# Patient Record
Sex: MALE | Race: ASIAN | NOT HISPANIC OR LATINO | Employment: OTHER | URBAN - METROPOLITAN AREA
[De-identification: names, ages, dates, MRNs, and addresses within clinical notes are randomized per-mention and may not be internally consistent; named-entity substitution may affect disease eponyms.]

---

## 2017-02-23 ENCOUNTER — OFFICE VISIT (OUTPATIENT)
Dept: INTERNAL MEDICINE | Facility: MEDICAL CENTER | Age: 57
End: 2017-02-23
Payer: MEDICAID

## 2017-02-23 VITALS
SYSTOLIC BLOOD PRESSURE: 130 MMHG | HEART RATE: 73 BPM | OXYGEN SATURATION: 97 % | DIASTOLIC BLOOD PRESSURE: 86 MMHG | WEIGHT: 149.2 LBS | HEIGHT: 63 IN | TEMPERATURE: 97.6 F | BODY MASS INDEX: 26.44 KG/M2

## 2017-02-23 DIAGNOSIS — E78.5 DYSLIPIDEMIA: ICD-10-CM

## 2017-02-23 DIAGNOSIS — M80.00XD PATHOLOGICAL FRACTURE DUE TO OSTEOPOROSIS WITH ROUTINE HEALING, UNSPECIFIED FRACTURE SITE, UNSPECIFIED OSTEOPOROSIS TYPE, SUBSEQUENT ENCOUNTER: ICD-10-CM

## 2017-02-23 DIAGNOSIS — M79.10 MYALGIA: ICD-10-CM

## 2017-02-23 DIAGNOSIS — G70.00 MG (MYASTHENIA GRAVIS) (HCC): ICD-10-CM

## 2017-02-23 DIAGNOSIS — R73.01 IMPAIRED FASTING BLOOD SUGAR: ICD-10-CM

## 2017-02-23 DIAGNOSIS — Z82.49 FAMILY HISTORY OF CORONARY ARTERY DISEASE: ICD-10-CM

## 2017-02-23 PROCEDURE — 99214 OFFICE O/P EST MOD 30 MIN: CPT | Performed by: INTERNAL MEDICINE

## 2017-02-23 NOTE — MR AVS SNAPSHOT
"        Wang Ivy   2017 9:00 AM   Office Visit   MRN: [id number]    Department:  [facility] Med - Internal Med   Dept Phone:  [phone]    Description:  Male : 1960   Provider:  Juan Griffith M.D.           Reason for Visit     Follow-Up reschedule follow up      Allergies as of 2017     Allergen Noted Reactions    Cillins [Penicillins] 03/10/2016   Anaphylaxis    Historical=Per family/ strong family history  RXN=unknown    Merrem [Meropenem] 03/10/2016   Unspecified    Historical=Family member had SJS reaction to this med  RXN=unknown      You were diagnosed with     Pathological fracture due to osteoporosis with routine healing, unspecified fracture site, unspecified osteoporosis type, subsequent encounter   [4868278]       Myalgia   [776612]       MG (myasthenia gravis) (CMS-[facility])   [265426]       Impaired fasting blood sugar   [589003]       Dyslipidemia   [623175]         Vital Signs     Blood Pressure Pulse Temperature Height Weight Body Mass Index    130/86 mmHg 73 36.4 °C (97.6 °F) 1.6 m (5' 3\") 67.677 kg (149 lb 3.2 oz) 26.44 kg/m2    Oxygen Saturation Smoking Status                97% Never Smoker           Basic Information     Date Of Birth Sex Race Ethnicity Preferred Language    1960 Male  Non- English      Your appointments     Aug 24, 2017  9:00 AM   Established Patient with Juan Griffith M.D.   [facility] Med - Internal Medicine (--)    [address] [place] [id number] [phone]           You will be receiving a confirmation call a few days before your appointment from our automated call confirmation system.              Problem List              ICD-10-CM Priority Class Noted - Resolved    MG (myasthenia gravis) (CMS-HCC) G70.00   3/10/2016 - Present    Acute back pain M54.9   3/10/2016 - Present    Osteoporosis with fracture M80.00XA   2016 - Present    Elevated liver enzymes R74.8   2016 - Present   " Subclinical hypothyroidism E03.9   6/13/2016 - Present    Compression fracture of cervical spine (CMS-[facility]) M48.52XA   6/13/2016 - Present    Impaired fasting blood sugar R73.01   10/21/2016 - Present    Dyslipidemia E78.5   10/21/2016 - Present    Heel pain, bilateral M79.671, M79.672   11/21/2016 - Present      Health Maintenance        Date Due Completion Dates    IMM DTaP/Tdap/Td Vaccine (1 - Tdap) 2/2/1978 ---    IMM INFLUENZA (1) 9/1/2016 ---    COLONOSCOPY 2/23/2027 2/23/2017 (N/S)    Override on 2/23/2017: (N/S) (PER [facility] AND [facility] NO COLONOSCOPY)            Current Immunizations     No immunizations on file.      Below and/or attached are the medications your provider expects you to take. Review all of your home medications and newly ordered medications with your provider and/or pharmacist. Follow medication instructions as directed by your provider and/or pharmacist. Please keep your medication list with you and share with your provider. Update the information when medications are discontinued, doses are changed, or new medications (including over-the-counter products) are added; and carry medication information at all times in the event of emergency situations     Allergies:  CILLINS - Anaphylaxis     MERREM - Unspecified               Medications  Valid as of: February 23, 2017 -  9:40 AM    Generic Name Brand Name Tablet Size Instructions for use    Alendronate Sodium (Tab) FOSAMAX 70 MG Take 1 Tab by mouth every 7 days.        Alendronate Sodium (Tab) FOSAMAX 70 MG TAKE ONE TABLET BY MOUTH EVERY 7 DAYS        Aspirin (Tab)  MG Take 650 mg by mouth as needed. Indications: Mild to Moderate Pain        azathioprine (IMURAN) 25 MG Tab (Tab) IMURAN 25 MG Take 50 mg by mouth 3 times a day.        B Complex Vitamins   Take 1 Tab by mouth every day.        Calcium   Take 1 Tab by mouth every day.        Cholecalciferol (Cap) Vitamin D 2000 UNITS Take 1 Cap by mouth every day.        Flaxseed (Linseed)    Take 1 Tab by mouth every day.        Misc Natural Products   Take 3 Tabs by mouth every day.        Oxycodone-Acetaminophen (Tab) PERCOCET-10  MG Take 1-2 Tabs by mouth every four hours as needed for Severe Pain.        PredniSONE (Tab) DELTASONE 20 MG Take 5 mg by mouth every morning. Indications: myasthenia gravis        Pyridostigmine Bromide (Tab) MESTINON 60 MG Take 60 mg by mouth 3 times a day. Indications: Myasthenia Gravis        Red Yeast Rice Extract (Tab) Red Yeast Rice Extract 600 MG Take 1 Tab by mouth 2 Times a Day.        Turmeric (Cap) Turmeric 500 MG Take 1,500 mg by mouth every day.        .                 Medicines prescribed today were sent to:     [facility] [place] - [id number] [facility] [place] [address]    Phone: [phone] Fax: [phone]    Open 24 Hours?: No      Medication refill instructions:       If your prescription bottle indicates you have medication refills left, it is not necessary to call your provider’s office. Please contact your pharmacy and they will refill your medication.    If your prescription bottle indicates you do not have any refills left, you may request refills at any time through one of the following ways: The online MyStream system (except Urgent Care), by calling your provider’s office, or by asking your pharmacy to contact your provider’s office with a refill request. Medication refills are processed only during regular business hours and may not be available until the next business day. Your provider may request additional information or to have a follow-up visit with you prior to refilling your medication.   *Please Note: Medication refills are assigned a new Rx number when refilled electronically. Your pharmacy may indicate that no refills were authorized even though a new prescription for the same medication is available at the pharmacy. Please request the medicine by name with the pharmacy before contacting your  provider for a refill.        Instructions    Keep losing weight and exercising          MyChart Access Code: Activation code not generated  Current MyChart Status: Active

## 2017-02-24 NOTE — PROGRESS NOTES
Established Patient    Mr. Pope a 57 y.o. male who presents today with:  CC: Follow-Up        Assessment and Plan    1. Myalgia  Improved. I suspect that this is secondary to side effects of Imuran plus his chronic steroid use. He does not have red flag symptoms such as fasciculations, atrophy or muscle weakness. He still has laboratory work pending which she states she will be done this week to evaluate for PMR, myopathy.    2. Pathological fracture due to osteoporosis with routine healing, unspecified fracture site, unspecified osteoporosis type, subsequent encounter  Secondary to chronic steroid use and osteoporosis. I suspect that he will require at least 5 years of bisphosphonate therapy. This has been approximately one year and fortunately he does not have side effects. Therefore he will continue weekly alendronate, vitamin D and calcium    3. MG (myasthenia gravis) (CMS-[facility])  Managed by neuro-ophthalmology.    4. Impaired fasting blood sugar  He has an A1c repeat pending. Encouraged patient to continue to lose weight and practiced sensible eating habits and exercise    5. Dyslipidemia  Repeat lipid panel is pending. I did inform him that it is unlikely that even if his lipid panel has improved his overall ASCVD will decrease enough so that he is not a candidate for statin therapy    6. Family history of coronary artery disease  This patient has one brother at the age of 67 with a triple bypass. I suspect that he most likely developed arterial sclerosis several years prior to this. He also has a brother with a significant history of coronary vessel disease requiring angioplasty in his early 50s. This patient has impaired fasting glucose, dyslipidemia and chronic steroid use all which can accelerate arteriosclerosis. We discussed methods of noninvasive risk stratification for coronary artery disease and he has agreed to coronary artery calcium scoring. He currently denies any dyspnea on exertion, chest pain on  exertion, fatigue with exertion.    Current Outpatient Prescriptions   Medication Sig Dispense Refill   • alendronate (FOSAMAX) 70 MG Tab Take 1 Tab by mouth every 7 days. 4 Tab 0   • predniSONE (DELTASONE) 20 MG Tab Take 5 mg by mouth every morning. Indications: myasthenia gravis     • pyridostigmine (MESTINON) 60 MG Tab Take 60 mg by mouth 3 times a day. Indications: Myasthenia Gravis     • azathioprine (IMURAN) 25 MG Tab Take 50 mg by mouth 3 times a day.     • Flaxseed, Linseed, (FLAXSEED OIL PO) Take 1 Tab by mouth every day.     • B Complex Vitamins (B COMPLEX 1 PO) Take 1 Tab by mouth every day.     • Cholecalciferol (VITAMIN D) 2000 UNITS Cap Take 1 Cap by mouth every day.     • CALCIUM PO Take 1 Tab by mouth every day.     • alendronate (FOSAMAX) 70 MG Tab TAKE ONE TABLET BY MOUTH EVERY 7 DAYS 4 Tab 3   • Oxycodone-Acetaminophen (PERCOCET-10)  MG Tab Take 1-2 Tabs by mouth every four hours as needed for Severe Pain. 60 Tab 0   • aspirin (ASA) 325 MG Tab Take 650 mg by mouth as needed. Indications: Mild to Moderate Pain     • Red Yeast Rice Extract 600 MG Tab Take 1 Tab by mouth 2 Times a Day.     • Misc Natural Products (GLUCOS-CHONDROIT-MSM COMPLEX PO) Take 3 Tabs by mouth every day.     • Turmeric 500 MG Cap Take 1,500 mg by mouth every day.       No current facility-administered medications for this visit.         followup Return in about 6 months (around 8/23/2017).      _______________________________________________________    HPI:   1. Myalgia  Patient is here for follow-up on myalgias. During his visit in December 2016, He described large muscle group myalgias without red flag symptoms of fasciculation, motor weakness, dark urine and use of statins. Most likely etiology of the time was a side effect of Imuran or chronic steroid use for his myasthenia gravis. I ordered at that time a creatinine kinase, ESR, aldolase, TSH and CBC. He has not done these lab tests as of yet. Fortunately, for some  reason his myalgias are not as severe as compared to the last visit. He still continues to deny any motor weakness or significant arthralgias. He is not that worried about an allergist at this point. It is normally worse at the end of the day.    2. Pathological fracture due to osteoporosis with routine healing, unspecified fracture site, unspecified osteoporosis type, subsequent encounter  He has a history of a thoracic spine fracture secondary osteoporosis. In the past he has significant post fracture pain. He is status post kyphoplasty. He currently is prescribed his phosphonate, vitamin D and calcium. He also tries to perform weightbearing exercises several days a week. I referred him to endocrinology for evaluation of a PTH analog such as Forteo. I reviewed the consultation. Dr. Parrish was satisfied with his current therapy because he had not suffered a fracture while taking bisphosphonates. He also had a remote history of hypogonadism. Repeat testosterone levels were within normal limits. Dr. Parrish did not think that hypogonadism was a accurate diagnosis.  He denies any gastroesophageal reflux symptoms such as dysphagia, odynophagia, heartburn or abdominal pain     3. MG (myasthenia gravis) (CMS-HCC)  Is currently being followed by Dr. Fritz for his myasthenia gravis. He is compliant with Imuran, Mestinon and prednisone. He currently is at 5 mg of prednisone daily. He is waiting for call back from his neuro-ophthalmologist to discuss the taper of his prednisone. In reviewing his neuro-ophthalmologist progress note, the plan was to decrease his prednisone to around 2.5 mg around February 2017. Patient is not excited about decreasing his dose to 2.5 because he is concerned that his vision will deteriorate with a lower dose. He will discuss this with his physician.  He currently denies any diplopia or blurred vision but he still continues to have smearing with motion. This is does not affect him when when he  "is driving   4. Impaired fasting blood sugar  He has a history of impaired fasting glucose. At the last visit I gave him a lab slip for a repeat A1c. His most recent A1c is 5.6. We discussed lifestyle modifications including improving his diet, exercise and weight loss. Fortunately he did loose some weight since the last visit. He normally eats daily at a local buffet, but he has decreased his buffet use and also his meat intake. He is also exercising several days per week    5. Dyslipidemia  He does have an ASCVD greater than 7.5% he is somewhat resistant to begin a statin. In the last visit he asked for a chance to perform lifestyle modifications and then repeat his lipid panel. Unfortunately, he did not get his labs drawn as of yet    6. Family history of coronary artery disease.  Patient states that he is older brother about 2 years recently had a triple bypass at the age of 67. His brother has diabetes. He also has a brother who had a MI with angioplasty in his early 50s.      has a past medical history of Myasthenia gravis (CMS-HCC) (2013); Cataract; Pain (03-); Flu; Subclinical hypothyroidism (6/13/2016); Adrenal insufficiency (CMS-HCC) (6/13/2016); and Compression fracture of cervical spine (CMS-HCC) (6/13/2016).     reports that he has never smoked. He has never used smokeless tobacco. He reports that he does not drink alcohol or use illicit drugs.      ROS: As per HPI. Additional pertinent symptoms as noted below:        Physical Exam  /86 mmHg  Pulse 73  Temp(Src) 36.4 °C (97.6 °F)  Ht 1.6 m (5' 3\")  Wt 67.677 kg (149 lb 3.2 oz)  BMI 26.44 kg/m2  SpO2 97%  Constitutional:  oriented to person, place, and time. No distress.   Eyes: Pupils are equal, round, and reactive to light. No scleral icterus.   Neck: Neck supple. No thyromegaly present.   Cardiovascular: Normal rate, regular rhythm and normal heart sounds.  Exam reveals no gallop and no friction rub.    No murmur " heard.  Pulmonary/Chest: Breath sounds normal. Chest wall is not dull to percussion.       Current Outpatient Prescriptions on File Prior to Visit   Medication Sig Dispense Refill   • alendronate (FOSAMAX) 70 MG Tab Take 1 Tab by mouth every 7 days. 4 Tab 0   • predniSONE (DELTASONE) 20 MG Tab Take 5 mg by mouth every morning. Indications: myasthenia gravis     • pyridostigmine (MESTINON) 60 MG Tab Take 60 mg by mouth 3 times a day. Indications: Myasthenia Gravis     • azathioprine (IMURAN) 25 MG Tab Take 50 mg by mouth 3 times a day.     • Flaxseed, Linseed, (FLAXSEED OIL PO) Take 1 Tab by mouth every day.     • B Complex Vitamins (B COMPLEX 1 PO) Take 1 Tab by mouth every day.     • Cholecalciferol (VITAMIN D) 2000 UNITS Cap Take 1 Cap by mouth every day.     • CALCIUM PO Take 1 Tab by mouth every day.     • alendronate (FOSAMAX) 70 MG Tab TAKE ONE TABLET BY MOUTH EVERY 7 DAYS 4 Tab 3   • Oxycodone-Acetaminophen (PERCOCET-10)  MG Tab Take 1-2 Tabs by mouth every four hours as needed for Severe Pain. 60 Tab 0   • aspirin (ASA) 325 MG Tab Take 650 mg by mouth as needed. Indications: Mild to Moderate Pain     • Red Yeast Rice Extract 600 MG Tab Take 1 Tab by mouth 2 Times a Day.     • Misc Natural Products (GLUCOS-CHONDROIT-MSM COMPLEX PO) Take 3 Tabs by mouth every day.     • Turmeric 500 MG Cap Take 1,500 mg by mouth every day.       No current facility-administered medications on file prior to visit.           Signed by: Juan Griffith M.D.

## 2017-08-24 ENCOUNTER — APPOINTMENT (OUTPATIENT)
Dept: INTERNAL MEDICINE | Facility: MEDICAL CENTER | Age: 57
End: 2017-08-24
Payer: MEDICAID

## 2017-11-09 ENCOUNTER — OFFICE VISIT (OUTPATIENT)
Dept: INTERNAL MEDICINE | Facility: MEDICAL CENTER | Age: 57
End: 2017-11-09
Payer: MEDICAID

## 2017-11-09 VITALS
WEIGHT: 146.2 LBS | BODY MASS INDEX: 25.91 KG/M2 | HEIGHT: 63 IN | TEMPERATURE: 96.8 F | DIASTOLIC BLOOD PRESSURE: 76 MMHG | OXYGEN SATURATION: 93 % | RESPIRATION RATE: 20 BRPM | HEART RATE: 68 BPM | SYSTOLIC BLOOD PRESSURE: 124 MMHG

## 2017-11-09 DIAGNOSIS — E03.8 SUBCLINICAL HYPOTHYROIDISM: ICD-10-CM

## 2017-11-09 DIAGNOSIS — R73.01 IMPAIRED FASTING BLOOD SUGAR: ICD-10-CM

## 2017-11-09 DIAGNOSIS — E78.5 DYSLIPIDEMIA: ICD-10-CM

## 2017-11-09 DIAGNOSIS — M79.10 MYALGIA: ICD-10-CM

## 2017-11-09 DIAGNOSIS — Z12.11 SCREEN FOR COLON CANCER: ICD-10-CM

## 2017-11-09 PROCEDURE — 99214 OFFICE O/P EST MOD 30 MIN: CPT | Performed by: INTERNAL MEDICINE

## 2017-11-09 ASSESSMENT — PAIN SCALES - GENERAL: PAINLEVEL: 10=SEVERE PAIN

## 2017-11-10 NOTE — PROGRESS NOTES
Established Patient    Mr. Pope a 57 y.o. male who presents today with:  CC: Follow-Up (myalgia) and Pain (joint pain)        Assessment and Plan    1. Impaired fasting blood sugar  I will send him to the lab for a A1c evaluation. We did discuss the effects of weight and his eating habits on his risk for diabetes. He will try to improve both. 2  - HEMOGLOBIN A1C; Future  - CRP HIGH SENSITIVE (CARDIAC); Future    2. Subclinical hypothyroidism  He currently has no symptoms of hypothyroidism. I will send him to the lab for a TSH    3. Dyslipidemia  Although he previously was a candidate for statin according to ASCVD, we will require him to repeat his lipids. At the last visit we discussed a coronary CT evaluation to assess his need for statin therapy. We will refer him again.  - CRP HIGH SENSITIVE (CARDIAC); Future  - LIPID PANEL    4. Myalgia  Suspected this is a side effect of Imuran. It is unlikely that he has significant myopathy. However, I will order lab work associated with muscle pain.  - WESTERGREN SED RATE; Future  - CREATINE KINASE; Future  - ALDOLASE; Future  - TSH WITH REFLEX TO FT4; Future  - CBC WITH DIFFERENTIAL; Future  - VITAMIN D,25 HYDROXY; Future    5. Screen for colon cancer  I will refer him to gastroenterology.  - REFERRAL TO GASTROENTEROLOGY      Current Outpatient Prescriptions   Medication Sig Dispense Refill   • alendronate (FOSAMAX) 70 MG Tab TAKE 1 TABLET BY MOUTH EVERY 7 DAYS 4 Tab 2   • alendronate (FOSAMAX) 70 MG Tab Take 1 Tab by mouth every 7 days. 4 Tab 0   • predniSONE (DELTASONE) 20 MG Tab Take 5 mg by mouth every morning. Indications: myasthenia gravis     • pyridostigmine (MESTINON) 60 MG Tab Take 60 mg by mouth 3 times a day. Indications: Myasthenia Gravis     • azathioprine (IMURAN) 25 MG Tab Take 50 mg by mouth 3 times a day.     • Flaxseed, Linseed, (FLAXSEED OIL PO) Take 1 Tab by mouth every day.     • B Complex Vitamins (B COMPLEX 1 PO) Take 1 Tab by mouth every day.     •  Cholecalciferol (VITAMIN D) 2000 UNITS Cap Take 1 Cap by mouth every day.     • CALCIUM PO Take 1 Tab by mouth every day.       No current facility-administered medications for this visit.          followup No Follow-up on file.    This note was created using voice recognition software (Dragon). The accuracy of the dictation is limited by the abilities of the software. I have reviewed the note prior to signing, however some errors in grammar and context are still possible. If you have any questions related to this note please do not hesitate to contact our office.   _______________________________________________________    HPI: He is here for follow-up on a coronary CT scan and also lab work. Unfortunately none of these were done since the last visit since the patient did not know where to go  1. Impaired fasting blood sugar  He has a history of impaired fasting blood glucose. He enjoys eating at buffets he does not exercise on a routine basis. His last A1c was October 2016 and was 5.6    2. Subclinical hypothyroidism  He has subclinical hypothyroidism. He currently has no symptoms of constipation, cold intolerance or fatigue.    3. Dyslipidemia  He also has dyslipidemia. In the past his ASCVD percentage was high enough towards statin therapy but he is relatively hesitant to use statins. Unfortunately, he did not have his lipids panel performed for this visit    4. Myalgia  He describes myalgias at the last visit in February. At the time he was prescribed both steroids and Imuran for his myasthenia gravis. He is myalgias are better. His neuro ophthalmologist discontinued his steroids.    5. Screen for colon cancer  He would like to be referred for colon cancer screening.       has a past medical history of Adrenal insufficiency (CMS-HCC) (6/13/2016); Cataract; Compression fracture of cervical spine (CMS-HCC) (6/13/2016); Flu; Myasthenia gravis (CMS-HCC) (2013); Pain (03-); and Subclinical hypothyroidism  "(6/13/2016).     reports that he has never smoked. He has never used smokeless tobacco. He reports that he does not drink alcohol or use drugs.      ROS: Pertinent positives as stated in HPI, all others reviewed as negative:        Physical Exam  /76   Pulse 68   Temp 36 °C (96.8 °F)   Resp 20   Ht 1.6 m (5' 3\")   Wt 66.3 kg (146 lb 3.2 oz)   SpO2 93%   BMI 25.90 kg/m²   Constitutional:  oriented to person, place, and time. No distress.           Current Outpatient Prescriptions on File Prior to Visit   Medication Sig Dispense Refill   • alendronate (FOSAMAX) 70 MG Tab TAKE 1 TABLET BY MOUTH EVERY 7 DAYS 4 Tab 2   • alendronate (FOSAMAX) 70 MG Tab Take 1 Tab by mouth every 7 days. 4 Tab 0   • predniSONE (DELTASONE) 20 MG Tab Take 5 mg by mouth every morning. Indications: myasthenia gravis     • pyridostigmine (MESTINON) 60 MG Tab Take 60 mg by mouth 3 times a day. Indications: Myasthenia Gravis     • azathioprine (IMURAN) 25 MG Tab Take 50 mg by mouth 3 times a day.     • Flaxseed, Linseed, (FLAXSEED OIL PO) Take 1 Tab by mouth every day.     • B Complex Vitamins (B COMPLEX 1 PO) Take 1 Tab by mouth every day.     • Cholecalciferol (VITAMIN D) 2000 UNITS Cap Take 1 Cap by mouth every day.     • CALCIUM PO Take 1 Tab by mouth every day.       No current facility-administered medications on file prior to visit.            Signed by: Juan Griffith M.D.  "

## 2017-12-12 DIAGNOSIS — M79.10 MYALGIA: ICD-10-CM

## 2017-12-12 DIAGNOSIS — E78.5 DYSLIPIDEMIA: ICD-10-CM

## 2017-12-12 DIAGNOSIS — R73.01 IMPAIRED FASTING BLOOD SUGAR: ICD-10-CM

## 2017-12-14 ENCOUNTER — OFFICE VISIT (OUTPATIENT)
Dept: INTERNAL MEDICINE | Facility: MEDICAL CENTER | Age: 57
End: 2017-12-14
Payer: MEDICAID

## 2017-12-14 VITALS
OXYGEN SATURATION: 95 % | HEIGHT: 63 IN | DIASTOLIC BLOOD PRESSURE: 80 MMHG | TEMPERATURE: 97 F | BODY MASS INDEX: 26.12 KG/M2 | WEIGHT: 147.4 LBS | HEART RATE: 74 BPM | SYSTOLIC BLOOD PRESSURE: 120 MMHG

## 2017-12-14 DIAGNOSIS — M79.10 MYALGIA: ICD-10-CM

## 2017-12-14 DIAGNOSIS — Z12.11 SCREENING FOR COLON CANCER: ICD-10-CM

## 2017-12-14 DIAGNOSIS — S12.9XXD COMPRESSION FRACTURE OF CERVICAL SPINE WITH ROUTINE HEALING, SUBSEQUENT ENCOUNTER: ICD-10-CM

## 2017-12-14 DIAGNOSIS — E78.5 DYSLIPIDEMIA: ICD-10-CM

## 2017-12-14 DIAGNOSIS — R73.01 IMPAIRED FASTING BLOOD SUGAR: ICD-10-CM

## 2017-12-14 DIAGNOSIS — E03.8 SUBCLINICAL HYPOTHYROIDISM: ICD-10-CM

## 2017-12-14 PROCEDURE — 99214 OFFICE O/P EST MOD 30 MIN: CPT | Performed by: INTERNAL MEDICINE

## 2017-12-14 NOTE — PROGRESS NOTES
Established Patient    Mr. Pope a 57 y.o. male who presents today with:  CC: Follow-Up (blood sugars) and Results (labs)        Assessment and Plan    1. Dyslipidemia  He is a candidate for primary prevention with a statin. He currently has signficant myalgia most likely from imuran. For now, he wants to improve his panel with better eating habits. I did inform him that he will continue to be a candidate but he'd rather not start now. He is gong back to oat meal and decreasing some of his red meat consumption  - LIPID PANEL    2. Impaired fasting blood sugar  Improved A1c is 5.2    3. Myalgia  Unchanged. Lab work negative. Suspect secondary to imuran    4. Compression fracture of cervical spine with routine healing, subsequent encounter  History of compression fracture. He has not had a DEXA and I will order again for him  - DS-BONE DENSITY STUDY (DEXA); Future    5. Subclinical hypothyroidism  TSH now withing normal limits    6. Screening for colon cancer  He has an appointment pending for a colonoscopy      Current Outpatient Prescriptions   Medication Sig Dispense Refill   • alendronate (FOSAMAX) 70 MG Tab TAKE 1 TABLET BY MOUTH EVERY 7 DAYS 4 Tab 2   • pyridostigmine (MESTINON) 60 MG Tab Take 60 mg by mouth 3 times a day. Indications: Myasthenia Gravis     • Flaxseed, Linseed, (FLAXSEED OIL PO) Take 1 Tab by mouth every day.     • B Complex Vitamins (B COMPLEX 1 PO) Take 1 Tab by mouth every day.     • Cholecalciferol (VITAMIN D) 2000 UNITS Cap Take 1 Cap by mouth every day.     • CALCIUM PO Take 1 Tab by mouth every day.     • alendronate (FOSAMAX) 70 MG Tab Take 1 Tab by mouth every 7 days. 4 Tab 0   • predniSONE (DELTASONE) 20 MG Tab Take 5 mg by mouth every morning. Indications: myasthenia gravis     • azathioprine (IMURAN) 25 MG Tab Take 50 mg by mouth 3 times a day.       No current facility-administered medications for this visit.          followup Return in about 3 months (around 3/14/2018).    This  note was created using voice recognition software (Dragon). The accuracy of the dictation is limited by the abilities of the software. I have reviewed the note prior to signing, however some errors in grammar and context are still possible. If you have any questions related to this note please do not hesitate to contact our office.   _______________________________________________________    HPI: Follow-up for several results.  1. Dyslipidemia  He has a prior history of dyslipidemia but only treated with red yeast rice in the past. He has never had a statin. I ordered a coronary CT scan which was not able to be performed due to insurance reasons. He has no symptoms of cardiac ischemia including chest pain shortness of breath or dizziness. His most recent lipid panel is as follows total cholesterol 302, HDL 48, triglycerides 331, . He has chronic myalgias which are most likely secondary to side effects of Imuran.  Reviewing his diet he eats out several times per day. He very rarely does any meal preparation on his own. Normally for breakfast he has scrambled eggs which he eats, sausage, charles, has brows. He eats hamburgers several days a week at fast food restaurants. He normally has steak once or twice per week. The only vegetable that he does eat his corn. But he will occasionally eats okay. Very minimal whole gr. He did eat instant oatmeal for about a month for breakfast until he returned back to scrambled eggs  2. Impaired fasting blood sugar  He has a history of impaired fasting glucose. However most recent A1c is 5.2.    3. Myalgia  He has chronic myalgias and not arthralgias mostly in the morning upon rising. She moves around these are much better. He has no rasheswill enjoy joints no fevers no chills. He noticed that this began when she started Imuran for myasthenia gravis. I ordered several tests including a CBC, ESR, vitamin D level, aldolase, TSH. These were normal. CPKs in the past were  "normal.    4. Compression fracture of cervical spine with routine healing, subsequent encounter  He has a prior history of compression fraction and history of osteoporosis. He is compliant with bisphosphonate therapy    5. Subclinical hypothyroidism  No symptoms of hypothyroidism. Most recent TSH 3.8    6. Screening for colon cancer  He has an appointment pending to discuss colon cancer screening with gastroenterology       has a past medical history of Adrenal insufficiency (CMS-HCC) (6/13/2016); Cataract; Compression fracture of cervical spine (CMS-HCC) (6/13/2016); Flu; Myasthenia gravis (CMS-HCC) (2013); Pain (03-); and Subclinical hypothyroidism (6/13/2016).     reports that he has never smoked. He has never used smokeless tobacco. He reports that he does not drink alcohol or use drugs.      ROS: Pertinent positives as stated in HPI, all others reviewed as negative:        Physical Exam  /80   Pulse 74   Temp 36.1 °C (97 °F)   Ht 1.6 m (5' 3\")   Wt 66.9 kg (147 lb 6.4 oz)   SpO2 95%   BMI 26.11 kg/m²   Constitutional:  oriented to person, place, and time. No distress.       Current Outpatient Prescriptions on File Prior to Visit   Medication Sig Dispense Refill   • alendronate (FOSAMAX) 70 MG Tab TAKE 1 TABLET BY MOUTH EVERY 7 DAYS 4 Tab 2   • pyridostigmine (MESTINON) 60 MG Tab Take 60 mg by mouth 3 times a day. Indications: Myasthenia Gravis     • Flaxseed, Linseed, (FLAXSEED OIL PO) Take 1 Tab by mouth every day.     • B Complex Vitamins (B COMPLEX 1 PO) Take 1 Tab by mouth every day.     • Cholecalciferol (VITAMIN D) 2000 UNITS Cap Take 1 Cap by mouth every day.     • CALCIUM PO Take 1 Tab by mouth every day.     • alendronate (FOSAMAX) 70 MG Tab Take 1 Tab by mouth every 7 days. 4 Tab 0   • predniSONE (DELTASONE) 20 MG Tab Take 5 mg by mouth every morning. Indications: myasthenia gravis     • azathioprine (IMURAN) 25 MG Tab Take 50 mg by mouth 3 times a day.       No current " facility-administered medications on file prior to visit.            Signed by: Juan Griffith M.D.

## 2018-01-02 RX ORDER — ALENDRONATE SODIUM 70 MG/1
TABLET ORAL
Qty: 12 TAB | Refills: 1 | Status: SHIPPED | OUTPATIENT
Start: 2018-01-02 | End: 2018-06-20 | Stop reason: SDUPTHER

## 2018-01-02 NOTE — TELEPHONE ENCOUNTER
Last seen: 12/14/17 by Dr. Griffith  Next appt: 03/15/18 with Dr. Griffith    Was the patient seen in the last year in this department? Yes   Does patient have an active prescription for medications requested? No   Received Request Via: Pharmacy

## 2018-03-15 ENCOUNTER — APPOINTMENT (OUTPATIENT)
Dept: INTERNAL MEDICINE | Facility: MEDICAL CENTER | Age: 58
End: 2018-03-15

## 2018-06-20 RX ORDER — ALENDRONATE SODIUM 70 MG/1
TABLET ORAL
Qty: 12 TAB | Refills: 0 | Status: SHIPPED | OUTPATIENT
Start: 2018-06-20 | End: 2018-08-09

## 2018-06-20 NOTE — TELEPHONE ENCOUNTER
Last seen: 12/14/17 by Dr. Griffith  Next appt: 06/28/18 with Dr. Griffith    Was the patient seen in the last year in this department? Yes   Does patient have an active prescription for medications requested? No   Received Request Via: Pharmacy

## 2018-06-28 ENCOUNTER — OFFICE VISIT (OUTPATIENT)
Dept: INTERNAL MEDICINE | Facility: MEDICAL CENTER | Age: 58
End: 2018-06-28
Payer: COMMERCIAL

## 2018-06-28 VITALS
SYSTOLIC BLOOD PRESSURE: 124 MMHG | DIASTOLIC BLOOD PRESSURE: 64 MMHG | WEIGHT: 145 LBS | HEART RATE: 67 BPM | HEIGHT: 63 IN | BODY MASS INDEX: 25.69 KG/M2 | TEMPERATURE: 98.2 F | OXYGEN SATURATION: 93 %

## 2018-06-28 DIAGNOSIS — M80.00XD PATHOLOGICAL FRACTURE DUE TO OSTEOPOROSIS WITH ROUTINE HEALING, UNSPECIFIED FRACTURE SITE, UNSPECIFIED OSTEOPOROSIS TYPE, SUBSEQUENT ENCOUNTER: ICD-10-CM

## 2018-06-28 DIAGNOSIS — E78.5 DYSLIPIDEMIA: ICD-10-CM

## 2018-06-28 DIAGNOSIS — M79.10 MYALGIA: ICD-10-CM

## 2018-06-28 PROCEDURE — 99214 OFFICE O/P EST MOD 30 MIN: CPT | Performed by: INTERNAL MEDICINE

## 2018-06-28 RX ORDER — EZETIMIBE 10 MG/1
10 TABLET ORAL DAILY
Qty: 30 TAB | Refills: 2 | Status: SHIPPED | OUTPATIENT
Start: 2018-06-28 | End: 2018-09-14 | Stop reason: SDUPTHER

## 2018-06-28 ASSESSMENT — PATIENT HEALTH QUESTIONNAIRE - PHQ9: CLINICAL INTERPRETATION OF PHQ2 SCORE: 0

## 2018-06-28 NOTE — PROGRESS NOTES
Established Patient    Mr. Pope a 58 y.o. male who presents today with:  CC: Follow-Up (results, dyslipidemia)        Assessment and Plan    1. Dyslipidemia  His ASCVD scores greater than 7.5%. I think it is reasonable to avoid statins because of his myalgias most likely associated with Imuran. However, he does agree to a trial of Zetia. He will use this medication for one month and monitor for any side effects. We will then repeat his lipid panel.  - LIPID PANEL    2. Pathological fracture due to osteoporosis with routine healing, unspecified fracture site, unspecified osteoporosis type, subsequent encounter  He has osteoporosis. Currently this is treated with alendronate 70 mg every week. He is also supplementing with vitamin D and calcium.    3. Myalgia  He has chronic myalgias of the lower extremities. I suspect that this is a side effect of Imuran. Currently it is more of a nuisance. I did recommend that he try CoQ10 and vitamin E which she agrees.      Current Outpatient Prescriptions   Medication Sig Dispense Refill   • ezetimibe (ZETIA) 10 MG Tab Take 1 Tab by mouth every day. 30 Tab 2   • alendronate (FOSAMAX) 70 MG Tab Take 1 Tab by mouth every 7 days. 4 Tab 0   • predniSONE (DELTASONE) 20 MG Tab Take 5 mg by mouth every morning. Indications: myasthenia gravis     • pyridostigmine (MESTINON) 60 MG Tab Take 60 mg by mouth 3 times a day. Indications: Myasthenia Gravis     • azathioprine (IMURAN) 25 MG Tab Take 50 mg by mouth 3 times a day.     • Flaxseed, Linseed, (FLAXSEED OIL PO) Take 1 Tab by mouth every day.     • B Complex Vitamins (B COMPLEX 1 PO) Take 1 Tab by mouth every day.     • Cholecalciferol (VITAMIN D) 2000 UNITS Cap Take 1 Cap by mouth every day.     • CALCIUM PO Take 1 Tab by mouth every day.     • alendronate (FOSAMAX) 70 MG Tab TAKE ONE TABLET BY MOUTH ONCE A WEEK 12 Tab 0     No current facility-administered medications for this visit.          followup Return in about 5 weeks (around  8/2/2018).    This note was created using voice recognition software (Dragon). The accuracy of the dictation is limited by the abilities of the software. I have reviewed the note prior to signing, however some errors in grammar and context are still possible. If you have any questions related to this note please do not hesitate to contact our office.   _______________________________________________________    HPI:   1. Dyslipidemia  He is here for follow-up of dyslipidemia. At the last visit in May, although his ASCVD was elevated he elected to treat his dyslipidemia lifestyle modifications. His repeat lipid panel showed improvement. I reviewed his labs from June 7, 2018. His total cholesterol decreased t256 from 274, triglycerides 304 from 341, HDL 50 from 44 and  from 162 . He does not want to be prescribed a statin secondary to his already existing myalgias most likely secondary to Imuran.    2. Pathological fracture due to osteoporosis with routine healing, unspecified fracture site, unspecified osteoporosis type, subsequent encounter  He has a history of a old lumbar compression fracture. He currently is compliant with alendronate 70 mg per week. Bone density was performed in April 2018. It is consistent with osteoporosis of the femur. He is compliant with vitamin D supplementation. He does have a history of low testosterone.    3. Myalgia  He describes generalized muscle aches in the hamstrings and thighs and calves and ankles in the morning when he wakes. The more he moves around the better the symptoms get. He did discuss this with his neuro-ophthalmologists but his neuro-ophthalmologists want him to continue with Imuran. He did not try CoQ10 and vitamin E as I recommended at a prior visit.       has a past medical history of Adrenal insufficiency ([facility]) (6/13/2016); Cataract; Compression fracture of cervical spine ([facility]) (6/13/2016); Flu; Myasthenia gravis ([facility]) (2013); Pain (03-); and  "Subclinical hypothyroidism (6/13/2016).     reports that he has never smoked. He has never used smokeless tobacco. He reports that he does not drink alcohol or use drugs.      ROS: Pertinent positives as stated in HPI, all others reviewed as negative:        Physical Exam  /64 Comment: re-check  Pulse 67   Temp 36.8 °C (98.2 °F)   Ht 1.6 m (5' 3\")   Wt 65.8 kg (145 lb)   SpO2 93%   BMI 25.69 kg/m²   Constitutional:  oriented to person, place, and time. No distress.       Current Outpatient Prescriptions on File Prior to Visit   Medication Sig Dispense Refill   • alendronate (FOSAMAX) 70 MG Tab Take 1 Tab by mouth every 7 days. 4 Tab 0   • predniSONE (DELTASONE) 20 MG Tab Take 5 mg by mouth every morning. Indications: myasthenia gravis     • pyridostigmine (MESTINON) 60 MG Tab Take 60 mg by mouth 3 times a day. Indications: Myasthenia Gravis     • azathioprine (IMURAN) 25 MG Tab Take 50 mg by mouth 3 times a day.     • Flaxseed, Linseed, (FLAXSEED OIL PO) Take 1 Tab by mouth every day.     • B Complex Vitamins (B COMPLEX 1 PO) Take 1 Tab by mouth every day.     • Cholecalciferol (VITAMIN D) 2000 UNITS Cap Take 1 Cap by mouth every day.     • CALCIUM PO Take 1 Tab by mouth every day.     • alendronate (FOSAMAX) 70 MG Tab TAKE ONE TABLET BY MOUTH ONCE A WEEK 12 Tab 0     No current facility-administered medications on file prior to visit.            Signed by: Juan Griffith M.D.  "

## 2018-07-03 ENCOUNTER — TELEPHONE (OUTPATIENT)
Dept: INTERNAL MEDICINE | Facility: MEDICAL CENTER | Age: 58
End: 2018-07-03

## 2018-07-03 NOTE — TELEPHONE ENCOUNTER
DOCUMENTATION OF PAR STATUS:    1. Name of Medication & Dose: ezetimibe 10mg 1 tab po qd     2. Name of Prescription Coverage Company & phone #: medicaid hpn [phone] #[id number]    3. Date Prior Auth Submitted: 07/03/18    4. What information was given to obtain insurance decision?     5. Prior Auth Letter Approved or Denied? Pending    6. Action Taken: Pharmacy/Patient Notified: Yes

## 2018-08-09 ENCOUNTER — OFFICE VISIT (OUTPATIENT)
Dept: INTERNAL MEDICINE | Facility: MEDICAL CENTER | Age: 58
End: 2018-08-09
Payer: COMMERCIAL

## 2018-08-09 VITALS
HEIGHT: 63 IN | TEMPERATURE: 97.6 F | BODY MASS INDEX: 26.19 KG/M2 | OXYGEN SATURATION: 93 % | WEIGHT: 147.8 LBS | DIASTOLIC BLOOD PRESSURE: 70 MMHG | SYSTOLIC BLOOD PRESSURE: 110 MMHG | HEART RATE: 90 BPM

## 2018-08-09 DIAGNOSIS — E78.5 DYSLIPIDEMIA: ICD-10-CM

## 2018-08-09 DIAGNOSIS — M25.562 ACUTE PAIN OF LEFT KNEE: ICD-10-CM

## 2018-08-09 PROCEDURE — 99214 OFFICE O/P EST MOD 30 MIN: CPT | Performed by: INTERNAL MEDICINE

## 2018-08-09 NOTE — PROGRESS NOTES
Established Patient    Mr. Pope a 58 y.o. male who presents today with:  CC: Follow-Up (labs)        Assessment and Plan    1.  Dyslipidemia-significant improvement in total cholesterol, LDL and triglycerides with the addition of Zetia.  He also made some significant changes in his eating habits which probably worked as well.  Continue with Zetia and repeat lipid panel in the future.    2.  Left knee pain-he may have strained his medial meniscus.  However he currently does not have knee instability or locking.  There is no significant edema.  I instructed him to increase Aleve to 1 pill twice a day.  He also can purchase a neoprene knee brace for compression.  Call office in a few weeks if his symptoms persist.    Current Outpatient Prescriptions   Medication Sig Dispense Refill   • ezetimibe (ZETIA) 10 MG Tab Take 1 Tab by mouth every day. 30 Tab 2   • alendronate (FOSAMAX) 70 MG Tab Take 1 Tab by mouth every 7 days. 4 Tab 0   • predniSONE (DELTASONE) 20 MG Tab Take 5 mg by mouth every morning. Indications: myasthenia gravis     • pyridostigmine (MESTINON) 60 MG Tab Take 60 mg by mouth 3 times a day. Indications: Myasthenia Gravis     • azathioprine (IMURAN) 25 MG Tab Take 50 mg by mouth 3 times a day.     • Flaxseed, Linseed, (FLAXSEED OIL PO) Take 1 Tab by mouth every day.     • B Complex Vitamins (B COMPLEX 1 PO) Take 1 Tab by mouth every day.     • Cholecalciferol (VITAMIN D) 2000 UNITS Cap Take 1 Cap by mouth every day.     • CALCIUM PO Take 1 Tab by mouth every day.     • alendronate (FOSAMAX) 70 MG Tab TAKE ONE TABLET BY MOUTH ONCE A WEEK 12 Tab 0     No current facility-administered medications for this visit.          followup No Follow-up on file.    This note was created using voice recognition software (Dragon). The accuracy of the dictation is limited by the abilities of the software. I have reviewed the note prior to signing, however some errors in grammar and context are still possible. If you have  "any questions related to this note please do not hesitate to contact our office.   _______________________________________________________    HPI:   He fell off a ladder on Monday and landed on his left knee. No immediate swelling. Pain is located medial to the patella. No instability. Pain is worse in the morning but as he walks on it it is better.He took Alleve one tablet at night. Night time pain can be worse. No locking or instability currently.  He is here to follow-up on lab results.  After his last lipid panel we agreed to use Zetia for lipid management.  He was concerned about myalgias associated with statins.  I reviewed his laboratory results from November 2017 and the most recent results from July 2018.  There has been a significant decrease in total cholesterol, triglycerides and LDL with Zetia.  He also is eating less cholesterol and less fat. Portion sizes are smaller as well     has a past medical history of Adrenal insufficiency ([facility]) (6/13/2016); Cataract; Compression fracture of cervical spine ([facility]) (6/13/2016); Flu; Myasthenia gravis ([facility]) (2013); Pain (03-); and Subclinical hypothyroidism (6/13/2016).     reports that he has never smoked. He has never used smokeless tobacco. He reports that he does not drink alcohol or use drugs.      ROS: Pertinent positives as stated in HPI, all others reviewed as negative:  Musculoskeletal/Extremities ROS: No peripheral edema, No pain, redness or swelling on the joints, Positive for pain left knee.      Physical Exam  /70   Pulse 90   Temp 36.4 °C (97.6 °F)   Ht 1.6 m (5' 3\")   Wt 67 kg (147 lb 12.8 oz)   SpO2 93%   BMI 26.18 kg/m²   Constitutional:  oriented to person, place, and time. No distress.   Eyes: Pupils are equal, round, and reactive to light. No scleral icterus.   Neck: Neck supple. No thyromegaly present.   Cardiovascular: Normal rate, regular rhythm and normal heart sounds.  Exam reveals no gallop and no friction rub.    No " murmur heard.  Pulmonary/Chest: Breath sounds normal. Chest wall is not dull to percussion.   Musculoskeletal:   no edema.  Mild edema of the medial left knee.  However no palpable fluid.  He Is not ballotable.  Slight pain in the medial joint line.  Slight tenderness to touch in the anserine bursa left.  Negative drawer test.  Full range of motion of the knee.  Negative Callie test          Current Outpatient Prescriptions on File Prior to Visit   Medication Sig Dispense Refill   • ezetimibe (ZETIA) 10 MG Tab Take 1 Tab by mouth every day. 30 Tab 2   • alendronate (FOSAMAX) 70 MG Tab Take 1 Tab by mouth every 7 days. 4 Tab 0   • predniSONE (DELTASONE) 20 MG Tab Take 5 mg by mouth every morning. Indications: myasthenia gravis     • pyridostigmine (MESTINON) 60 MG Tab Take 60 mg by mouth 3 times a day. Indications: Myasthenia Gravis     • azathioprine (IMURAN) 25 MG Tab Take 50 mg by mouth 3 times a day.     • Flaxseed, Linseed, (FLAXSEED OIL PO) Take 1 Tab by mouth every day.     • B Complex Vitamins (B COMPLEX 1 PO) Take 1 Tab by mouth every day.     • Cholecalciferol (VITAMIN D) 2000 UNITS Cap Take 1 Cap by mouth every day.     • CALCIUM PO Take 1 Tab by mouth every day.     • alendronate (FOSAMAX) 70 MG Tab TAKE ONE TABLET BY MOUTH ONCE A WEEK 12 Tab 0     No current facility-administered medications on file prior to visit.            Signed by: Juan Griffith M.D.

## 2018-08-16 ENCOUNTER — HOSPITAL ENCOUNTER (EMERGENCY)
Facility: MEDICAL CENTER | Age: 58
End: 2018-08-16
Attending: EMERGENCY MEDICINE
Payer: COMMERCIAL

## 2018-08-16 ENCOUNTER — APPOINTMENT (OUTPATIENT)
Dept: RADIOLOGY | Facility: MEDICAL CENTER | Age: 58
End: 2018-08-16
Attending: EMERGENCY MEDICINE
Payer: COMMERCIAL

## 2018-08-16 VITALS
BODY MASS INDEX: 25.86 KG/M2 | WEIGHT: 145.94 LBS | DIASTOLIC BLOOD PRESSURE: 84 MMHG | TEMPERATURE: 97.9 F | SYSTOLIC BLOOD PRESSURE: 147 MMHG | OXYGEN SATURATION: 93 % | HEIGHT: 63 IN | RESPIRATION RATE: 16 BRPM | HEART RATE: 55 BPM

## 2018-08-16 DIAGNOSIS — N23 RENAL COLIC: ICD-10-CM

## 2018-08-16 DIAGNOSIS — N20.1 URETEROLITHIASIS: ICD-10-CM

## 2018-08-16 LAB
ALBUMIN SERPL BCP-MCNC: 4.3 G/DL (ref 3.2–4.9)
ALBUMIN/GLOB SERPL: 1.4 G/DL
ALP SERPL-CCNC: 56 U/L (ref 30–99)
ALT SERPL-CCNC: 42 U/L (ref 2–50)
ANION GAP SERPL CALC-SCNC: 9 MMOL/L (ref 0–11.9)
APPEARANCE UR: CLEAR
AST SERPL-CCNC: 39 U/L (ref 12–45)
BASOPHILS # BLD AUTO: 0.5 % (ref 0–1.8)
BASOPHILS # BLD: 0.05 K/UL (ref 0–0.12)
BILIRUB SERPL-MCNC: 1 MG/DL (ref 0.1–1.5)
BILIRUB UR QL STRIP.AUTO: NEGATIVE
BUN SERPL-MCNC: 21 MG/DL (ref 8–22)
CALCIUM SERPL-MCNC: 9.3 MG/DL (ref 8.4–10.2)
CHLORIDE SERPL-SCNC: 107 MMOL/L (ref 96–112)
CO2 SERPL-SCNC: 22 MMOL/L (ref 20–33)
COLOR UR: YELLOW
CREAT SERPL-MCNC: 1.36 MG/DL (ref 0.5–1.4)
EOSINOPHIL # BLD AUTO: 0.08 K/UL (ref 0–0.51)
EOSINOPHIL NFR BLD: 0.8 % (ref 0–6.9)
ERYTHROCYTE [DISTWIDTH] IN BLOOD BY AUTOMATED COUNT: 45.5 FL (ref 35.9–50)
GLOBULIN SER CALC-MCNC: 3 G/DL (ref 1.9–3.5)
GLUCOSE SERPL-MCNC: 104 MG/DL (ref 65–99)
GLUCOSE UR STRIP.AUTO-MCNC: NEGATIVE MG/DL
HCT VFR BLD AUTO: 47.6 % (ref 42–52)
HGB BLD-MCNC: 16.1 G/DL (ref 14–18)
IMM GRANULOCYTES # BLD AUTO: 0.03 K/UL (ref 0–0.11)
IMM GRANULOCYTES NFR BLD AUTO: 0.3 % (ref 0–0.9)
KETONES UR STRIP.AUTO-MCNC: NEGATIVE MG/DL
LACTATE BLD-SCNC: 1.2 MMOL/L (ref 0.5–2)
LEUKOCYTE ESTERASE UR QL STRIP.AUTO: NEGATIVE
LIPASE SERPL-CCNC: 126 U/L (ref 7–58)
LYMPHOCYTES # BLD AUTO: 0.56 K/UL (ref 1–4.8)
LYMPHOCYTES NFR BLD: 5.6 % (ref 22–41)
MCH RBC QN AUTO: 31.6 PG (ref 27–33)
MCHC RBC AUTO-ENTMCNC: 33.8 G/DL (ref 33.7–35.3)
MCV RBC AUTO: 93.3 FL (ref 81.4–97.8)
MICRO URNS: NORMAL
MONOCYTES # BLD AUTO: 0.7 K/UL (ref 0–0.85)
MONOCYTES NFR BLD AUTO: 7 % (ref 0–13.4)
NEUTROPHILS # BLD AUTO: 8.64 K/UL (ref 1.82–7.42)
NEUTROPHILS NFR BLD: 85.8 % (ref 44–72)
NITRITE UR QL STRIP.AUTO: NEGATIVE
NRBC # BLD AUTO: 0 K/UL
NRBC BLD-RTO: 0 /100 WBC
PH UR STRIP.AUTO: 5.5 [PH]
PLATELET # BLD AUTO: 185 K/UL (ref 164–446)
PMV BLD AUTO: 9.7 FL (ref 9–12.9)
POTASSIUM SERPL-SCNC: 3.8 MMOL/L (ref 3.6–5.5)
PROT SERPL-MCNC: 7.3 G/DL (ref 6–8.2)
PROT UR QL STRIP: NEGATIVE MG/DL
RBC # BLD AUTO: 5.1 M/UL (ref 4.7–6.1)
RBC UR QL AUTO: NEGATIVE
SODIUM SERPL-SCNC: 138 MMOL/L (ref 135–145)
SP GR UR REFRACTOMETRY: 1.02
WBC # BLD AUTO: 10.1 K/UL (ref 4.8–10.8)

## 2018-08-16 PROCEDURE — 96375 TX/PRO/DX INJ NEW DRUG ADDON: CPT

## 2018-08-16 PROCEDURE — 83605 ASSAY OF LACTIC ACID: CPT

## 2018-08-16 PROCEDURE — 700111 HCHG RX REV CODE 636 W/ 250 OVERRIDE (IP): Performed by: EMERGENCY MEDICINE

## 2018-08-16 PROCEDURE — 99285 EMERGENCY DEPT VISIT HI MDM: CPT

## 2018-08-16 PROCEDURE — 700117 HCHG RX CONTRAST REV CODE 255: Performed by: EMERGENCY MEDICINE

## 2018-08-16 PROCEDURE — 36415 COLL VENOUS BLD VENIPUNCTURE: CPT

## 2018-08-16 PROCEDURE — 96374 THER/PROPH/DIAG INJ IV PUSH: CPT

## 2018-08-16 PROCEDURE — 81003 URINALYSIS AUTO W/O SCOPE: CPT

## 2018-08-16 PROCEDURE — 74177 CT ABD & PELVIS W/CONTRAST: CPT

## 2018-08-16 PROCEDURE — 85025 COMPLETE CBC W/AUTO DIFF WBC: CPT

## 2018-08-16 PROCEDURE — 80053 COMPREHEN METABOLIC PANEL: CPT

## 2018-08-16 PROCEDURE — 83690 ASSAY OF LIPASE: CPT

## 2018-08-16 RX ORDER — MORPHINE SULFATE 4 MG/ML
4 INJECTION, SOLUTION INTRAMUSCULAR; INTRAVENOUS ONCE
Status: COMPLETED | OUTPATIENT
Start: 2018-08-16 | End: 2018-08-16

## 2018-08-16 RX ORDER — ONDANSETRON 2 MG/ML
4 INJECTION INTRAMUSCULAR; INTRAVENOUS ONCE
Status: COMPLETED | OUTPATIENT
Start: 2018-08-16 | End: 2018-08-16

## 2018-08-16 RX ORDER — OXYCODONE HYDROCHLORIDE AND ACETAMINOPHEN 5; 325 MG/1; MG/1
1 TABLET ORAL EVERY 4 HOURS PRN
Qty: 15 TAB | Refills: 0 | Status: SHIPPED | OUTPATIENT
Start: 2018-08-16 | End: 2018-08-19

## 2018-08-16 RX ADMIN — HYDROMORPHONE HYDROCHLORIDE 0.5 MG: 1 INJECTION, SOLUTION INTRAMUSCULAR; INTRAVENOUS; SUBCUTANEOUS at 19:26

## 2018-08-16 RX ADMIN — MORPHINE SULFATE 4 MG: 4 INJECTION INTRAVENOUS at 18:26

## 2018-08-16 RX ADMIN — ONDANSETRON 4 MG: 2 INJECTION INTRAMUSCULAR; INTRAVENOUS at 18:27

## 2018-08-16 RX ADMIN — IOHEXOL 100 ML: 350 INJECTION, SOLUTION INTRAVENOUS at 19:03

## 2018-08-16 ASSESSMENT — PAIN SCALES - GENERAL
PAINLEVEL_OUTOF10: 6
PAINLEVEL_OUTOF10: 2
PAINLEVEL_OUTOF10: 7

## 2018-08-17 NOTE — ED NOTES
"Chief Complaint   Patient presents with   • LLQ Pain     started one week ago, per pt \"dull pain that is just constant\"     BP (!) 167/107   Pulse 65   Temp 36.7 °C (98 °F)   Resp 16   Ht 1.6 m (5' 3\")   Wt 66.2 kg (145 lb 15.1 oz)   SpO2 98%   BMI 25.85 kg/m²     [place] BP:  183/102    Pt currently denies c/o CP or back pain, denies c/o dysuria or N/V.   "

## 2018-08-17 NOTE — ED NOTES
[place] fall assessment completed.  Pt is high risk for fall.  Interventions complete. Wrist band placed on pt., green sign on door.  Bed locked in low position, call light within reach.  Personal possessions in reach.  Personal needs assessed. Pt will be moved to a more visible room if available.  Will continue to monitor safety.

## 2018-08-17 NOTE — ED PROVIDER NOTES
"ED Provider Note    CHIEF COMPLAINT  Chief Complaint   Patient presents with   • LLQ Pain     started one week ago, per pt \"dull pain that is just constant\"       HPI  Wang Ivy is a 58 y.o. male who presents with complaints of left lower quadrant abdominal pain which started about a week ago and has been persistent.  There has been a little bit of flank pain as well.  He does not report other associated symptoms.  He has no nausea vomiting diarrhea dysuria or other urinary symptoms.  He denies fever chills.  No history of similar.    REVIEW OF SYSTEMS  See HPI for further details.  Constitutional no fever or chills all other systems are negative.    PAST MEDICAL HISTORY  Past Medical History:   Diagnosis Date   • Adrenal insufficiency ([facility]) 6/13/2016   • Cataract     r, early stage   • Compression fracture of cervical spine ([facility]) 6/13/2016   • Flu     03-   • Myasthenia gravis ([facility]) 2013   • Pain 03-    back, 4/10   • Subclinical hypothyroidism 6/13/2016       FAMILY HISTORY  Family History   Problem Relation Age of Onset   • Heart Attack Mother    • Heart Attack Father        SOCIAL HISTORY  Social History     Social History   • Marital status: Single     Spouse name: N/A   • Number of children: N/A   • Years of education: N/A     Social History Main Topics   • Smoking status: Never Smoker   • Smokeless tobacco: Never Used   • Alcohol use No   • Drug use: No   • Sexual activity: No     Other Topics Concern   • Not on file     Social History Narrative   • No narrative on file       SURGICAL HISTORY  Past Surgical History:   Procedure Laterality Date   • KYPHOPLASTY N/A 4/4/2016    Procedure: KYPHOPLASTY L2,3,4;  Surgeon: Manan Larsen M.D.;  Location: SURGERY [facility];  Service:    • CYST EXCISION Left     ganglion       CURRENT MEDICATIONS  Home Medications    **Home medications have not yet been reviewed for this encounter**         ALLERGIES  Allergies   Allergen Reactions   • " "Cillins [Penicillins] Anaphylaxis     Historical=Per family/ strong family history  RXN=unknown   • Merrem [Meropenem] Unspecified     Historical=Family member had SJS reaction to this med  RXN=unknown       PHYSICAL EXAM  VITAL SIGNS: BP (!) 167/107   Pulse 65   Temp 36.7 °C (98 °F)   Resp 16   Ht 1.6 m (5' 3\")   Wt 66.2 kg (145 lb 15.1 oz)   SpO2 98%   BMI 25.85 kg/m²     Constitutional: Well developed, Well nourished, No acute distress, Non-toxic appearance.   Psychiatric:  Normal psychiatric exam, Normal affect, Normal judgement, Normal mood,  .able to give a good history.   .   Neck: Normal range of motion, No tenderness, Supple, No stridor.   Lymphatic: No cervical or axillary lymphadenopathy noted.   Cardiovascular: Normal heart rate, Normal rhythm, No murmurs,  , No gallops.   Thorax & Lungs: Normal breath sounds, No respiratory distress, No wheezing, or other abnormal breath sounds. No chest tenderness.   Abdomen: Bowel sounds normal, Soft, minimal midline abdominal tenderness, No masses, No pulsatile masses. No guarding.  Skin: Warm, Dry, No erythema, No rash.   Back: No tenderness, No CVA tenderness.   Extremities: Intact distal pulses, No edema, No tenderness, No cyanosis, No clubbing.   Musculoskeletal: Good range of motion in all major joints. No tenderness to palpation or major deformities, or injuries noted.   Neurologic: Alert & oriented x 3, Normal motor function, Normal sensory function, No focal deficits noted.          RADIOLOGY/PROCEDURES  CT-ABDOMEN-PELVIS WITH   Final Result      2 right ureteral stones with right-sided hydronephrosis and hydroureter.      Diverticulosis without evidence of diverticulitis.      Aortic atherosclerosis.        Results for orders placed or performed during the hospital encounter of 08/16/18   CBC WITH DIFFERENTIAL   Result Value Ref Range    WBC 10.1 4.8 - 10.8 K/uL    RBC 5.10 4.70 - 6.10 M/uL    Hemoglobin 16.1 14.0 - 18.0 g/dL    Hematocrit 47.6 42.0 - " 52.0 %    MCV 93.3 81.4 - 97.8 fL    MCH 31.6 27.0 - 33.0 pg    MCHC 33.8 33.7 - 35.3 g/dL    RDW 45.5 35.9 - 50.0 fL    Platelet Count 185 164 - 446 K/uL    MPV 9.7 9.0 - 12.9 fL    Neutrophils-Polys 85.80 (H) 44.00 - 72.00 %    Lymphocytes 5.60 (L) 22.00 - 41.00 %    Monocytes 7.00 0.00 - 13.40 %    Eosinophils 0.80 0.00 - 6.90 %    Basophils 0.50 0.00 - 1.80 %    Immature Granulocytes 0.30 0.00 - 0.90 %    Nucleated RBC 0.00 /100 WBC    Neutrophils (Absolute) 8.64 (H) 1.82 - 7.42 K/uL    Lymphs (Absolute) 0.56 (L) 1.00 - 4.80 K/uL    Monos (Absolute) 0.70 0.00 - 0.85 K/uL    Eos (Absolute) 0.08 0.00 - 0.51 K/uL    Baso (Absolute) 0.05 0.00 - 0.12 K/uL    Immature Granulocytes (abs) 0.03 0.00 - 0.11 K/uL    NRBC (Absolute) 0.00 K/uL   COMP METABOLIC PANEL   Result Value Ref Range    Sodium 138 135 - 145 mmol/L    Potassium 3.8 3.6 - 5.5 mmol/L    Chloride 107 96 - 112 mmol/L    Co2 22 20 - 33 mmol/L    Anion Gap 9.0 0.0 - 11.9    Glucose 104 (H) 65 - 99 mg/dL    Bun 21 8 - 22 mg/dL    Creatinine 1.36 0.50 - 1.40 mg/dL    Calcium 9.3 8.4 - 10.2 mg/dL    AST(SGOT) 39 12 - 45 U/L    ALT(SGPT) 42 2 - 50 U/L    Alkaline Phosphatase 56 30 - 99 U/L    Total Bilirubin 1.0 0.1 - 1.5 mg/dL    Albumin 4.3 3.2 - 4.9 g/dL    Total Protein 7.3 6.0 - 8.2 g/dL    Globulin 3.0 1.9 - 3.5 g/dL    A-G Ratio 1.4 g/dL   LIPASE   Result Value Ref Range    Lipase 126 (H) 7 - 58 U/L   LACTIC ACID   Result Value Ref Range    Lactic Acid 1.2 0.5 - 2.0 mmol/L   URINALYSIS CULTURE, IF INDICATED   Result Value Ref Range    Micro Urine Req see below     Color Yellow     Character Clear     Ph 5.5 5.0 - 8.0    Glucose Negative Negative mg/dL    Ketones Negative Negative mg/dL    Protein Negative Negative mg/dL    Bilirubin Negative Negative    Nitrite Negative Negative    Leukocyte Esterase Negative Negative    Occult Blood Negative Negative   REFRACTOMETER SG   Result Value Ref Range    Specific Gravity 1.023    ESTIMATED GFR   Result Value Ref  Range    GFR If African American >60 >60 mL/min/1.73 m 2    GFR If Non African American 54 (A) >60 mL/min/1.73 m 2       COURSE & MEDICAL DECISION MAKING  Pertinent Labs & Imaging studies reviewed. (See chart for details)  Patient appears to have renal colic secondary to right-sided ureteral stones.  Measured at 2 and 3 mm he should be able to pass these on his own however he has had pain for a week I did discuss this with the urologist who will follow him up will prescribe some pain medication for him I discussed the findings with the patient    FINAL IMPRESSION  1.  Renal colic  2.  Ureterolithiasis  3.       Electronically signed by: Juan Luis Montoya, 8/16/2018 6:15 PM

## 2018-08-17 NOTE — ED NOTES
Bedside report received from Donavon LOVETT.  Assumed care of pt.  Pt c/o LLQ abd pain worse after morphine.  Pain now 8/10. MD advised

## 2018-11-15 ENCOUNTER — OFFICE VISIT (OUTPATIENT)
Dept: INTERNAL MEDICINE | Facility: MEDICAL CENTER | Age: 58
End: 2018-11-15
Payer: COMMERCIAL

## 2018-11-15 VITALS
OXYGEN SATURATION: 94 % | BODY MASS INDEX: 26.12 KG/M2 | HEIGHT: 63 IN | TEMPERATURE: 97 F | WEIGHT: 147.4 LBS | SYSTOLIC BLOOD PRESSURE: 110 MMHG | DIASTOLIC BLOOD PRESSURE: 70 MMHG | HEART RATE: 69 BPM

## 2018-11-15 DIAGNOSIS — M25.562 ACUTE PAIN OF BOTH KNEES: ICD-10-CM

## 2018-11-15 DIAGNOSIS — M25.561 ACUTE PAIN OF BOTH KNEES: ICD-10-CM

## 2018-11-15 DIAGNOSIS — Z82.49 FAMILY HISTORY OF CORONARY ARTERY DISEASE: ICD-10-CM

## 2018-11-15 DIAGNOSIS — E78.5 DYSLIPIDEMIA: ICD-10-CM

## 2018-11-15 PROCEDURE — 99214 OFFICE O/P EST MOD 30 MIN: CPT | Performed by: INTERNAL MEDICINE

## 2018-11-15 NOTE — PROGRESS NOTES
Established Patient    Mr. Pope a 58 y.o. male who presents today with:  CC: Follow-Up (dyslipidemia)        Assessment and Plan    1.  Family history of coronary disease.-He has risk factors for CAD and currently no symptoms of ischemia.  He is concerned about using statin therapy to reduce his risk.  I suggest that he have a CT cardiac scoring test to help make decisions about appropriate preventive strategies.  He agrees  2.  Dyslipidemia-recently had labs drawn however I have not been able to review them as of yet.  He will have the results sent to me and thus I will review.  I will wait for the results of the CT cardiac scoring to determine benefits of statin therapy.  I am concerned because he does have myalgias associated with Imuran.  3.  Knee pain-resolved completely.    Current Outpatient Prescriptions   Medication Sig Dispense Refill   • ezetimibe (ZETIA) 10 MG Tab TAKE 1 TABLET BY MOUTH ONCE DAILY 90 Tab 2   • alendronate (FOSAMAX) 70 MG Tab Take 1 Tab by mouth every 7 days. 4 Tab 0   • predniSONE (DELTASONE) 20 MG Tab Take 5 mg by mouth every morning. Indications: myasthenia gravis     • pyridostigmine (MESTINON) 60 MG Tab Take 60 mg by mouth 3 times a day. Indications: Myasthenia Gravis     • azathioprine (IMURAN) 25 MG Tab Take 50 mg by mouth 3 times a day.     • Flaxseed, Linseed, (FLAXSEED OIL PO) Take 1 Tab by mouth every day.     • B Complex Vitamins (B COMPLEX 1 PO) Take 1 Tab by mouth every day.     • Cholecalciferol (VITAMIN D) 2000 UNITS Cap Take 1 Cap by mouth every day.     • CALCIUM PO Take 1 Tab by mouth every day.     • alendronate (FOSAMAX) 70 MG Tab TAKE 1 TABLET BY MOUTH ONCE A WEEK 12 Tab 2     No current facility-administered medications for this visit.          followup No Follow-up on file.    This note was created using voice recognition software (Dragon). The accuracy of the dictation is limited by the abilities of the software. I have reviewed the note prior to signing, however  "some errors in grammar and context are still possible. If you have any questions related to this note please do not hesitate to contact our office.   _______________________________________________________    HPI:   List: stress test, knee has improved  He wants a stress test.  He has no chest pain, shortness of breath or decreased exercise tolerance.  No symptoms of angina.  His sister is 69. Recently had stent placed for CAD. Older brother with triple bypass and CAD. He has a friend who does echocardiograms. And she did one for him for free. He was told that his echocardiogram was okay she recommended he have a test.  Has never been on statin, however does have muscle pain most likely secondary to Imuran from myasthenia gravis.  At the last visit he complained of knee pain after trauma.  Knee pain is better.  No swelling, minimal pain.  No redness.       has a past medical history of Adrenal insufficiency ([facility]) (6/13/2016); Cataract; Compression fracture of cervical spine ([facility]) (6/13/2016); Flu; Myasthenia gravis ([facility]) (2013); Pain (03-); and Subclinical hypothyroidism (6/13/2016).     reports that he has never smoked. He has never used smokeless tobacco. He reports that he does not drink alcohol or use drugs.      ROS: Pertinent positives as stated in HPI, all others reviewed as negative:  Cardiovascular ROS: No exercise intolerance, No chest pain, No shortness of breath, No dyspnea on exertion, No edema, No palpitations, No syncope      Physical Exam  /70 (BP Location: Right arm, Patient Position: Sitting, BP Cuff Size: Adult)   Pulse 69   Temp 36.1 °C (97 °F) (Temporal)   Ht 1.6 m (5' 3\")   Wt 66.9 kg (147 lb 6.4 oz)   SpO2 94%   BMI 26.11 kg/m²   Constitutional:  oriented to person, place, and time. No distress.   Eyes: Pupils are equal, round, and reactive to light. No scleral icterus.   Neck: Neck supple. No thyromegaly present.   Cardiovascular: Normal rate, regular rhythm and normal " heart sounds.  Exam reveals no gallop and no friction rub.    No murmur heard.  Pulmonary/Chest: Breath sounds normal. Chest wall is not dull to percussion.   Musculoskeletal:   no edema.   Lymphadenopathy: no cervical adenopathy  Neurological: alert and oriented to person, place, and time.   Skin: No cyanosis. Nails show no clubbing.          Current Outpatient Prescriptions on File Prior to Visit   Medication Sig Dispense Refill   • ezetimibe (ZETIA) 10 MG Tab TAKE 1 TABLET BY MOUTH ONCE DAILY 90 Tab 2   • alendronate (FOSAMAX) 70 MG Tab Take 1 Tab by mouth every 7 days. 4 Tab 0   • predniSONE (DELTASONE) 20 MG Tab Take 5 mg by mouth every morning. Indications: myasthenia gravis     • pyridostigmine (MESTINON) 60 MG Tab Take 60 mg by mouth 3 times a day. Indications: Myasthenia Gravis     • azathioprine (IMURAN) 25 MG Tab Take 50 mg by mouth 3 times a day.     • Flaxseed, Linseed, (FLAXSEED OIL PO) Take 1 Tab by mouth every day.     • B Complex Vitamins (B COMPLEX 1 PO) Take 1 Tab by mouth every day.     • Cholecalciferol (VITAMIN D) 2000 UNITS Cap Take 1 Cap by mouth every day.     • CALCIUM PO Take 1 Tab by mouth every day.     • alendronate (FOSAMAX) 70 MG Tab TAKE 1 TABLET BY MOUTH ONCE A WEEK 12 Tab 2     No current facility-administered medications on file prior to visit.            Signed by: Juan Griffith M.D.

## 2018-12-04 ENCOUNTER — HOSPITAL ENCOUNTER (OUTPATIENT)
Dept: RADIOLOGY | Facility: MEDICAL CENTER | Age: 58
End: 2018-12-04
Attending: INTERNAL MEDICINE
Payer: COMMERCIAL

## 2018-12-04 DIAGNOSIS — Z82.49 FAMILY HISTORY OF CORONARY ARTERY DISEASE: ICD-10-CM

## 2018-12-04 DIAGNOSIS — E78.5 DYSLIPIDEMIA: ICD-10-CM

## 2018-12-04 PROCEDURE — 4410556 CT-CARDIAC SCORING

## 2019-02-21 ENCOUNTER — OFFICE VISIT (OUTPATIENT)
Dept: INTERNAL MEDICINE | Facility: MEDICAL CENTER | Age: 59
End: 2019-02-21
Payer: COMMERCIAL

## 2019-02-21 VITALS
WEIGHT: 147.8 LBS | HEART RATE: 68 BPM | HEIGHT: 63 IN | OXYGEN SATURATION: 96 % | SYSTOLIC BLOOD PRESSURE: 120 MMHG | TEMPERATURE: 97.1 F | DIASTOLIC BLOOD PRESSURE: 80 MMHG | BODY MASS INDEX: 26.19 KG/M2

## 2019-02-21 DIAGNOSIS — M77.41 METATARSALGIA OF RIGHT FOOT: ICD-10-CM

## 2019-02-21 DIAGNOSIS — E78.5 DYSLIPIDEMIA: ICD-10-CM

## 2019-02-21 PROCEDURE — 99214 OFFICE O/P EST MOD 30 MIN: CPT | Performed by: INTERNAL MEDICINE

## 2019-02-21 RX ORDER — EZETIMIBE 10 MG/1
TABLET ORAL
Qty: 30 TAB | Refills: 3 | Status: SHIPPED | OUTPATIENT
Start: 2019-02-21 | End: 2019-06-24 | Stop reason: SDUPTHER

## 2019-02-21 ASSESSMENT — PATIENT HEALTH QUESTIONNAIRE - PHQ9: CLINICAL INTERPRETATION OF PHQ2 SCORE: 0

## 2019-02-21 NOTE — PROGRESS NOTES
Established Patient    Mr. Pope a 59 y.o. male who presents today with:  CC: Follow-Up (labs)        Assessment and Plan    1.  Metatarsalgia right foot-possible secondary to Stearns's neuroma.  Unlikely  plantar fascitis.  Will refer to podiatry.  2.  Dyslipidemia- CT calcium score is 1.1. Low risk. Continue Zetia for now. LDL decreased to 75. Recheck lipids in 3 months.     Current Outpatient Prescriptions   Medication Sig Dispense Refill   • ezetimibe (ZETIA) 10 MG Tab TAKE 1 TABLET BY MOUTH ONCE DAILY 90 Tab 2   • alendronate (FOSAMAX) 70 MG Tab Take 1 Tab by mouth every 7 days. 4 Tab 0   • predniSONE (DELTASONE) 20 MG Tab Take 5 mg by mouth every morning. Indications: myasthenia gravis     • pyridostigmine (MESTINON) 60 MG Tab Take 60 mg by mouth 3 times a day. Indications: Myasthenia Gravis     • azathioprine (IMURAN) 25 MG Tab Take 50 mg by mouth 3 times a day.     • Flaxseed, Linseed, (FLAXSEED OIL PO) Take 1 Tab by mouth every day.     • B Complex Vitamins (B COMPLEX 1 PO) Take 1 Tab by mouth every day.     • Cholecalciferol (VITAMIN D) 2000 UNITS Cap Take 1 Cap by mouth every day.     • CALCIUM PO Take 1 Tab by mouth every day.     • alendronate (FOSAMAX) 70 MG Tab TAKE 1 TABLET BY MOUTH ONCE A WEEK 12 Tab 2     No current facility-administered medications for this visit.          followup No Follow-up on file.    This note was created using voice recognition software (Dragon). The accuracy of the dictation is limited by the abilities of the software. I have reviewed the note prior to signing, however some errors in grammar and context are still possible. If you have any questions related to this note please do not hesitate to contact our office.   _______________________________________________________    HPI:   List: agustina mejia results  Mr. Diaz is a 59-year-old man with a history of dyslipidemia, myasthenia gravis subclinical hypothyroidism impaired fasting blood glucose and osteoporosis.    He is here  "for follow-up on lipid panels.  He has a history of myalgias most likely related to Imuran.  Prior discussions we decided not to begin statin therapy due to risk of myalgias.  Instead Zetia was prescribed.  He is taking zetia daily and most recent LDL.down to 75. CT from 198.  He also significantly reduced his red meat consumption.  At the last visit we discussed the utility of a calcium CT score.  He completed this test.  His calcium score December 2018 was 1.1   Previously he was diagnosed with plantar fasciitis of the right foot.  He describes pain in the metatarsal area.  Pain is worse in the morning but then progressively gets better.  Pain significantly affects his sliding foot when he bowls.  He recently had a pedicure.  During the pedicure the tech squeezed his metatarsals together which caused significant pain.  Denies any trauma to the foot.   has a past medical history of Adrenal insufficiency ([facility]) (6/13/2016); Cataract; Compression fracture of cervical spine ([facility]) (6/13/2016); Flu; Myasthenia gravis ([facility]) (2013); Pain (03-); and Subclinical hypothyroidism (6/13/2016).     reports that he has never smoked. He has never used smokeless tobacco. He reports that he does not drink alcohol or use drugs.      ROS: Pertinent positives as stated in HPI, all others reviewed as negative:  Cardiovascular ROS: No exercise intolerance, No chest pain, No shortness of breath, No edema, No palpitations, No syncope      Physical Exam  /80 (BP Location: Right arm, Patient Position: Sitting, BP Cuff Size: Adult)   Pulse 68   Temp 36.2 °C (97.1 °F) (Temporal)   Ht 1.6 m (5' 3\")   Wt 67 kg (147 lb 12.8 oz)   SpO2 96%   BMI 26.18 kg/m²   Constitutional:  oriented to person, place, and time. No distress.   Muscle skeletal: Right foot: No pain along the plantar fascia or heel.  Pain to palpation across the metatarsals.  Slight discomfort when metatarsal area is compressed.      Current Outpatient Prescriptions " on File Prior to Visit   Medication Sig Dispense Refill   • ezetimibe (ZETIA) 10 MG Tab TAKE 1 TABLET BY MOUTH ONCE DAILY 90 Tab 2   • alendronate (FOSAMAX) 70 MG Tab Take 1 Tab by mouth every 7 days. 4 Tab 0   • predniSONE (DELTASONE) 20 MG Tab Take 5 mg by mouth every morning. Indications: myasthenia gravis     • pyridostigmine (MESTINON) 60 MG Tab Take 60 mg by mouth 3 times a day. Indications: Myasthenia Gravis     • azathioprine (IMURAN) 25 MG Tab Take 50 mg by mouth 3 times a day.     • Flaxseed, Linseed, (FLAXSEED OIL PO) Take 1 Tab by mouth every day.     • B Complex Vitamins (B COMPLEX 1 PO) Take 1 Tab by mouth every day.     • Cholecalciferol (VITAMIN D) 2000 UNITS Cap Take 1 Cap by mouth every day.     • CALCIUM PO Take 1 Tab by mouth every day.     • alendronate (FOSAMAX) 70 MG Tab TAKE 1 TABLET BY MOUTH ONCE A WEEK 12 Tab 2     No current facility-administered medications on file prior to visit.            Signed by: Juan Griffith M.D.

## 2019-02-22 ENCOUNTER — TELEPHONE (OUTPATIENT)
Dept: INTERNAL MEDICINE | Facility: MEDICAL CENTER | Age: 59
End: 2019-02-22

## 2019-02-22 NOTE — TELEPHONE ENCOUNTER
DOCUMENTATION OF PAR STATUS:    1. Name of Medication & Dose: Alendronate 70 mg     2. Name of Prescription Coverage Company & phone #: [employer] [phone]    3. Date Prior Auth Submitted: 02/22/19    4. What information was given to obtain insurance decision? Dx Code    5. Prior Auth Letter Approved or Denied? pending    6. Action Taken: Pharmacy/Patient Notified: Yes - patient    No PA required

## 2019-02-22 NOTE — TELEPHONE ENCOUNTER
DOCUMENTATION OF PAR STATUS:    1. Name of Medication & Dose: Ezetimibe 10 mg    2. Name of Prescription Coverage Company & phone #: [employer] [place] [employer] [id number]    3. Date Prior Auth Submitted: 02/22/19    4. What information was given to obtain insurance decision? Dx code    5. Prior Auth Letter Approved or Denied? pending    6. Action Taken: Pharmacy/Patient Notified: Yes - patient    Denied and decision was given to provider

## 2019-05-23 ENCOUNTER — OFFICE VISIT (OUTPATIENT)
Dept: INTERNAL MEDICINE | Facility: MEDICAL CENTER | Age: 59
End: 2019-05-23
Payer: COMMERCIAL

## 2019-05-23 VITALS
HEIGHT: 63 IN | SYSTOLIC BLOOD PRESSURE: 110 MMHG | TEMPERATURE: 97.5 F | OXYGEN SATURATION: 92 % | HEART RATE: 75 BPM | DIASTOLIC BLOOD PRESSURE: 60 MMHG | WEIGHT: 146.4 LBS | BODY MASS INDEX: 25.94 KG/M2

## 2019-05-23 DIAGNOSIS — E78.5 DYSLIPIDEMIA: ICD-10-CM

## 2019-05-23 DIAGNOSIS — R05.9 COUGH: ICD-10-CM

## 2019-05-23 DIAGNOSIS — G57.61 MORTON'S NEUROMA OF RIGHT FOOT: ICD-10-CM

## 2019-05-23 PROCEDURE — 99214 OFFICE O/P EST MOD 30 MIN: CPT | Performed by: INTERNAL MEDICINE

## 2019-05-23 NOTE — PROGRESS NOTES
Established Patient    Mr. Pope a 59 y.o. male who presents today with:  CC: Follow-Up (metatarsalgia of right foot, no coverage for either ezetimibe and alendronate) and Cough (x2 weeks)        Assessment and Plan    1.  Cough-most likely secondary to allergic rhinitis with postnasal drip.  However patient took 6 days of tetracycline that he had at home.  Currently improving.  Still has some symptoms of a cough.  Recommend that he continue with sinus rinses followed by Flonase.    2.  Dyslipidemia-he has a history of dyslipidemia with the very low coronary calcium score.  Last LDL was June 2018 at 145.  In the past he has not been able to tolerate statins secondary to myalgias.  He currently is prescribed Zetia.  Continue Zetia.    3.  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  He is status post alcohol ablation which worked very well for him.  Continue follow-up with podiatry.  Current Outpatient Prescriptions   Medication Sig Dispense Refill   • alendronate (FOSAMAX) 70 MG Tab Take 1 Tab by mouth every 7 days. 4 Tab 0   • predniSONE (DELTASONE) 20 MG Tab Take 5 mg by mouth every morning. Indications: myasthenia gravis     • pyridostigmine (MESTINON) 60 MG Tab Take 60 mg by mouth 3 times a day. Indications: Myasthenia Gravis     • azathioprine (IMURAN) 25 MG Tab Take 50 mg by mouth 3 times a day.     • Flaxseed, Linseed, (FLAXSEED OIL PO) Take 1 Tab by mouth every day.     • B Complex Vitamins (B COMPLEX 1 PO) Take 1 Tab by mouth every day.     • Cholecalciferol (VITAMIN D) 2000 UNITS Cap Take 1 Cap by mouth every day.     • CALCIUM PO Take 1 Tab by mouth every day.     • ezetimibe (ZETIA) 10 MG Tab TAKE 1 TABLET BY MOUTH ONCE DAILY 30 Tab 3   • alendronate (FOSAMAX) 70 MG Tab TAKE 1 TABLET BY MOUTH ONCE A WEEK 12 Tab 2     No current facility-administered medications for this visit.          followup No Follow-up on file.    This note was created using voice recognition  software (Dragon). The accuracy of the dictation is limited by the abilities of the software. I have reviewed the note prior to signing, however some errors in grammar and context are still possible. If you have any questions related to this note please do not hesitate to contact our office.   _______________________________________________________    HPI:   Cough x 2-3 weeks. Had nasal congestion at that time. No sinus pain. Neti pot helps somewhat. Mostly at night when lying down. If he props himself up at night it improves. He had abx at home- tetracycline for 6 days. Cough is still there. He had a sore throat as well. Robitussin helped. Cough is 60-70% better. Mild production was green now clear.  He had SOB initially and then it improved. No heartburn or indigestion.     He was seen by podiatry for his foot pain. He was told he had a owens's neuroma. S/p steroid injection without effect. He was given alcohol injections q 2 weeks x 3. He was given some pads to put in his shoes.  His pain is better and he can walk now.     He has dyslipidemia. I prescribed his zetia for his dyslipidemia due to myalgias associated with statins.  In addition he has chronic mild myalgias related to interferon for treatment for his myasthenia gravis.  He had a coronary CT scan with a total score 1.1 2018.  He states that his insurance will not pay for Zetia which costs him about $40 a month.     has a past medical history of Adrenal insufficiency ([facility]) (6/13/2016); Cataract; Compression fracture of cervical spine ([facility]) (6/13/2016); Flu; Myasthenia gravis ([facility]) (2013); Pain (03-); and Subclinical hypothyroidism (6/13/2016).     reports that he has never smoked. He has never used smokeless tobacco. He reports that he does not drink alcohol or use drugs.      ROS: Pertinent positives as stated in HPI, all others reviewed as negative:  Constitutional ROS: No fatigue, No unexplained fevers, sweats, or chills  Mouth/Throat ROS: No  "sore throat  Pulmonary ROS: No dyspnea on exertion, No wheezing, No shortness of breath, No recent change in breathing  Cardiovascular ROS: No shortness of breath, No dyspnea on exertion      Physical Exam  /60 (BP Location: Right arm, Patient Position: Sitting, BP Cuff Size: Adult)   Pulse 75   Temp 36.4 °C (97.5 °F) (Temporal)   Ht 1.6 m (5' 3\")   Wt 66.4 kg (146 lb 6.4 oz)   SpO2 92%   BMI 25.93 kg/m²   Constitutional:  oriented to person, place, and time. No distress.   Eyes: Pupils are equal, round, and reactive to light. No scleral icterus.   Neck: Neck supple. No thyromegaly present.   Cardiovascular: Normal rate, regular rhythm and normal heart sounds.  Exam reveals no gallop and no friction rub.    No murmur heard.  Pulmonary/Chest: Breath sounds normal. Chest wall is not dull to percussion.  No rales no rhonchi.  Musculoskeletal:   no edema.  No right metatarsal pain to palpation or flexion.  Lymphadenopathy: no cervical adenopathy  Neurological: alert and oriented to person, place, and time.   Skin: No cyanosis. Nails show no clubbing.  Other: Head: Slightly enlarged bilateral tonsils without exudate.  No posterior pharynx erythema.  Patient reports mild tenderness to percussion of the frontal sinus.  Maxillary sinuses normal to palpation.  Bilateral nostrils with clear viscous discharge.  No purulence.        Current Outpatient Prescriptions on File Prior to Visit   Medication Sig Dispense Refill   • alendronate (FOSAMAX) 70 MG Tab Take 1 Tab by mouth every 7 days. 4 Tab 0   • predniSONE (DELTASONE) 20 MG Tab Take 5 mg by mouth every morning. Indications: myasthenia gravis     • pyridostigmine (MESTINON) 60 MG Tab Take 60 mg by mouth 3 times a day. Indications: Myasthenia Gravis     • azathioprine (IMURAN) 25 MG Tab Take 50 mg by mouth 3 times a day.     • Flaxseed, Linseed, (FLAXSEED OIL PO) Take 1 Tab by mouth every day.     • B Complex Vitamins (B COMPLEX 1 PO) Take 1 Tab by mouth every " day.     • Cholecalciferol (VITAMIN D) 2000 UNITS Cap Take 1 Cap by mouth every day.     • CALCIUM PO Take 1 Tab by mouth every day.     • ezetimibe (ZETIA) 10 MG Tab TAKE 1 TABLET BY MOUTH ONCE DAILY 30 Tab 3   • alendronate (FOSAMAX) 70 MG Tab TAKE 1 TABLET BY MOUTH ONCE A WEEK 12 Tab 2     No current facility-administered medications on file prior to visit.            Signed by: Juan Griffith M.D.

## 2019-05-29 RX ORDER — ALENDRONATE SODIUM 70 MG/1
TABLET ORAL
Qty: 12 TAB | Refills: 2 | Status: SHIPPED | OUTPATIENT
Start: 2019-05-29 | End: 2021-12-02

## 2019-05-29 NOTE — TELEPHONE ENCOUNTER
Last seen: 05/23/19 by Dr. Griffith  Next appt: 08/29/19 with Dr. Griffith    Was the patient seen in the last year in this department? Yes   Does patient have an active prescription for medications requested? No   Received Request Via: Pharmacy

## 2021-03-15 DIAGNOSIS — Z23 NEED FOR VACCINATION: ICD-10-CM

## 2021-07-22 ENCOUNTER — HOSPITAL ENCOUNTER (OUTPATIENT)
Dept: RADIOLOGY | Facility: MEDICAL CENTER | Age: 61
End: 2021-07-22
Attending: INTERNAL MEDICINE
Payer: COMMERCIAL

## 2021-07-22 DIAGNOSIS — M81.0 SENILE OSTEOPOROSIS: ICD-10-CM

## 2021-07-22 PROCEDURE — 77080 DXA BONE DENSITY AXIAL: CPT

## 2021-10-14 ENCOUNTER — APPOINTMENT (OUTPATIENT)
Dept: INTERNAL MEDICINE | Facility: OTHER | Age: 61
End: 2021-10-14
Payer: COMMERCIAL

## 2021-12-02 ENCOUNTER — OFFICE VISIT (OUTPATIENT)
Dept: INTERNAL MEDICINE | Facility: OTHER | Age: 61
End: 2021-12-02
Payer: COMMERCIAL

## 2021-12-02 VITALS
HEIGHT: 63 IN | WEIGHT: 146.6 LBS | TEMPERATURE: 97.2 F | OXYGEN SATURATION: 94 % | SYSTOLIC BLOOD PRESSURE: 132 MMHG | BODY MASS INDEX: 25.98 KG/M2 | HEART RATE: 63 BPM | DIASTOLIC BLOOD PRESSURE: 84 MMHG

## 2021-12-02 DIAGNOSIS — E78.5 DYSLIPIDEMIA: ICD-10-CM

## 2021-12-02 DIAGNOSIS — R05.3 CHRONIC COUGH: ICD-10-CM

## 2021-12-02 DIAGNOSIS — G89.29 CHRONIC HIP PAIN, BILATERAL: ICD-10-CM

## 2021-12-02 DIAGNOSIS — G70.00 MYASTHENIA GRAVIS (HCC): ICD-10-CM

## 2021-12-02 DIAGNOSIS — M25.552 CHRONIC HIP PAIN, BILATERAL: ICD-10-CM

## 2021-12-02 DIAGNOSIS — M80.88XD PATHOLOGICAL FRACTURE OF VERTEBRA DUE TO OTHER OSTEOPOROSIS WITH ROUTINE HEALING, SUBSEQUENT ENCOUNTER: ICD-10-CM

## 2021-12-02 DIAGNOSIS — M25.551 CHRONIC HIP PAIN, BILATERAL: ICD-10-CM

## 2021-12-02 PROCEDURE — 99214 OFFICE O/P EST MOD 30 MIN: CPT | Mod: GC | Performed by: STUDENT IN AN ORGANIZED HEALTH CARE EDUCATION/TRAINING PROGRAM

## 2021-12-02 RX ORDER — EZETIMIBE 10 MG/1
10 TABLET ORAL DAILY
Qty: 90 TABLET | Refills: 3 | Status: SHIPPED | OUTPATIENT
Start: 2021-12-02 | End: 2022-12-13 | Stop reason: SDUPTHER

## 2021-12-02 ASSESSMENT — PATIENT HEALTH QUESTIONNAIRE - PHQ9: CLINICAL INTERPRETATION OF PHQ2 SCORE: 0

## 2021-12-02 NOTE — PATIENT INSTRUCTIONS
Get over the counter Pepcid and try daily for two weeks     Food Choices for Gastroesophageal Reflux Disease, Adult  When you have gastroesophageal reflux disease (GERD), the foods you eat and your eating habits are very important. Choosing the right foods can help ease your discomfort. Think about working with a nutrition specialist (dietitian) to help you make good choices.  What are tips for following this plan?    Meals  · Choose healthy foods that are low in fat, such as fruits, vegetables, whole grains, low-fat dairy products, and lean meat, fish, and poultry.  · Eat small meals often instead of 3 large meals a day. Eat your meals slowly, and in a place where you are relaxed. Avoid bending over or lying down until 2-3 hours after eating.  · Avoid eating meals 2-3 hours before bed.  · Avoid drinking a lot of liquid with meals.  · Cook foods using methods other than frying. Bake, grill, or broil food instead.  · Avoid or limit:  ? Chocolate.  ? Peppermint or spearmint.  ? Alcohol.  ? Pepper.  ? Black and decaffeinated coffee.  ? Black and decaffeinated tea.  ? Bubbly (carbonated) soft drinks.  ? Caffeinated energy drinks and soft drinks.  · Limit high-fat foods such as:  ? Fatty meat or fried foods.  ? Whole milk, cream, butter, or ice cream.  ? Nuts and nut butters.  ? Pastries, donuts, and sweets made with butter or shortening.  · Avoid foods that cause symptoms. These foods may be different for everyone. Common foods that cause symptoms include:  ? Tomatoes.  ? Oranges, santiago, and limes.  ? Peppers.  ? Spicy food.  ? Onions and garlic.  ? Vinegar.  Lifestyle  · Maintain a healthy weight. Ask your doctor what weight is healthy for you. If you need to lose weight, work with your doctor to do so safely.  · Exercise for at least 30 minutes for 5 or more days each week, or as told by your doctor.  · Wear loose-fitting clothes.  · Do not smoke. If you need help quitting, ask your doctor.  · Sleep with the head of  your bed higher than your feet. Use a wedge under the mattress or blocks under the bed frame to raise the head of the bed.  Summary  · When you have gastroesophageal reflux disease (GERD), food and lifestyle choices are very important in easing your symptoms.  · Eat small meals often instead of 3 large meals a day. Eat your meals slowly, and in a place where you are relaxed.  · Limit high-fat foods such as fatty meat or fried foods.  · Avoid bending over or lying down until 2-3 hours after eating.  · Avoid peppermint and spearmint, caffeine, alcohol, and chocolate.  This information is not intended to replace advice given to you by your health care provider. Make sure you discuss any questions you have with your health care provider.  Document Released: 06/18/2013 Document Revised: 04/09/2020 Document Reviewed: 01/23/2018  Elsevier Patient Education © 2020 [employer]

## 2021-12-02 NOTE — PROGRESS NOTES
Established Patient    Patient Care Team:  Rosario Servin D.O. as PCP - General (Internal Medicine)    HPI:  Wang Ivy is a 61 y.o. male who presents today with the following Chief Complaint(s): Follow up for Diagnoses of Myasthenia gravis (HCC), Dyslipidemia, Chronic hip pain, bilateral, Chronic cough, and Pathological fracture of vertebra due to other osteoporosis with routine healing, subsequent encounter were pertinent to this visit.    Myasthenia Gravis:   Patient of Dr. Taylor, on Imuran and Azathioprine  Denies dysarthria, neck weakness or proximal muscle weakness.   Does report a chronic cough after eating and drinking      Chronic Cough: Believe to be component of acid reflux and post nasal discharge.     Hx of Back and Hip pain: Has been going to physical therapy and it is helping with his weakness and chronic pain. Would like to continue working with physical therapy     Dyslipidemia: Tolerating Zetia  Lipid Panel UTD: 7/2021 - LDL of 107    ROS:     Denies any new chest pain or shortness of breath.  No changes to urinary or bowel function. See HPI.  ROS      Past Medical History:   Diagnosis Date   • Adrenal insufficiency (HCC) 6/13/2016   • Cataract     r, early stage   • Compression fracture of cervical spine (HCC) 6/13/2016   • Flu     03-   • Myasthenia gravis (HCC) 2013   • Pain 03-    back, 4/10   • Subclinical hypothyroidism 6/13/2016     Social History     Tobacco Use   • Smoking status: Never Smoker   • Smokeless tobacco: Never Used   Substance Use Topics   • Alcohol use: No     Alcohol/week: 0.0 oz   • Drug use: No     Current Outpatient Medications   Medication Sig Dispense Refill   • ezetimibe (ZETIA) 10 MG Tab Take 1 Tablet by mouth every day. 90 Tablet 3   • pyridostigmine (MESTINON) 60 MG Tab Take 60 mg by mouth 3 times a day. Indications: Myasthenia Gravis     • azathioprine (IMURAN) 25 MG Tab Take 50 mg by mouth every day.     • Flaxseed, Linseed,  "(FLAXSEED OIL PO) Take 1 Tab by mouth every day.     • B Complex Vitamins (B COMPLEX 1 PO) Take 1 Tab by mouth every day.     • Cholecalciferol (VITAMIN D) 2000 UNITS Cap Take 1 Cap by mouth every day.     • CALCIUM PO Take 1 Tab by mouth every day.       No current facility-administered medications for this visit.       Physical Exam:  /84 (BP Location: Left arm, Patient Position: Sitting, BP Cuff Size: Adult)   Pulse 63   Temp 36.2 °C (97.2 °F) (Temporal)   Ht 1.6 m (5' 3\")   Wt 66.5 kg (146 lb 9.6 oz)   SpO2 94%   BMI 25.97 kg/m²   General: Well developed, well nourished male, in no distress.  Eyes: Conjuntiva without any obvious injection or erythema.   Cardiovascular: Heart is regular with no murmur  Lungs: Clear to auscultation bilaterally. No wheezes, rhonci or crackles heard. Respiratory effort is normal.  Abd: Soft, non-tender  Ext: No edema      Assessment and Plan:   Today, I referred Mr. Ivy to physical therapy to continue to work on his hip and back pain. I have also discussed the possibility that the cough after eating/drinking could be muscular weakness secondary to his Myasthenia Gravis. However, we would rule out GERD or Rhinitis induced cough. He will start on H2 blocker and notice for any improvements.     Given that he has been on 5 years of bisphosphonate treatment with stable DEXA scans. We discussed the possibility of stopping Fosamax. He would like to proceed with discontinuing fosamax.   Follow up in 3 months     Diagnoses and all orders for this visit:  Myasthenia gravis (HCC)  Dyslipidemia  Chronic hip pain, bilateral  -     Referral to Physical Therapy  Chronic cough  Pathological fracture of vertebra due to other osteoporosis with routine healing, subsequent encounter  Other orders  -     ezetimibe (ZETIA) 10 MG Tab; Take 1 Tablet by mouth every day.          Rosario Servin D.O PGY III  [facility]    "

## 2021-12-03 PROBLEM — M16.10 ARTHRITIS, HIP: Status: ACTIVE | Noted: 2021-07-09

## 2022-03-03 ENCOUNTER — OFFICE VISIT (OUTPATIENT)
Dept: INTERNAL MEDICINE | Facility: OTHER | Age: 62
End: 2022-03-03
Payer: COMMERCIAL

## 2022-03-03 VITALS
HEIGHT: 63 IN | SYSTOLIC BLOOD PRESSURE: 146 MMHG | HEART RATE: 64 BPM | DIASTOLIC BLOOD PRESSURE: 93 MMHG | OXYGEN SATURATION: 94 % | WEIGHT: 143.8 LBS | BODY MASS INDEX: 25.48 KG/M2 | TEMPERATURE: 97.2 F

## 2022-03-03 DIAGNOSIS — G89.29 CHRONIC BILATERAL LOW BACK PAIN WITHOUT SCIATICA: ICD-10-CM

## 2022-03-03 DIAGNOSIS — M54.50 CHRONIC BILATERAL LOW BACK PAIN WITHOUT SCIATICA: ICD-10-CM

## 2022-03-03 DIAGNOSIS — K21.9 GASTROESOPHAGEAL REFLUX DISEASE WITHOUT ESOPHAGITIS: ICD-10-CM

## 2022-03-03 DIAGNOSIS — M16.10 ARTHRITIS, HIP: ICD-10-CM

## 2022-03-03 PROCEDURE — 99213 OFFICE O/P EST LOW 20 MIN: CPT | Mod: GE | Performed by: STUDENT IN AN ORGANIZED HEALTH CARE EDUCATION/TRAINING PROGRAM

## 2022-03-03 ASSESSMENT — PATIENT HEALTH QUESTIONNAIRE - PHQ9: CLINICAL INTERPRETATION OF PHQ2 SCORE: 0

## 2022-03-03 NOTE — PROGRESS NOTES
Established Patient    Patient Care Team:  Rosario Servin D.O. as PCP - General (Internal Medicine)    HPI:  Wang Ivy is a 61 y.o. male who presents today with the following Chief Complaint(s): Follow up for Diagnoses of Arthritis, hip, Chronic bilateral low back pain without sciatica, and Gastroesophageal reflux disease without esophagitis were pertinent to this visit.    Myasthenia Gravis:   Patient of Dr. Taylor, on Imuran and Azathioprine  Denies dysarthria, neck weakness or proximal muscle weakness. Reports he will be seeing Dr. Obando after Dr. Taylor retires. His azathioprine dose was reduced - he feels well.     Chronic Cough: Component of acid reflux and post nasal discharge. Resolved after using H2 blockers and stopping Fosamax (5 year duration therapy)  Buys [employer] off brand - Famotidine    Still drinks coffee - on an empty stomach, some spicy food     Hx of Back and Hip pain: Has been going to physical therapy and it is helping with his hip weakness and chronic pain. Would like to continue working with physical therapy. Reports that he is working on improving his posture and abdominal muscles to take strain off his back.     Dyslipidemia: Tolerating Zetia  Lipid Panel UTD: 7/2021 - LDL of 107    ROS:     Denies any new chest pain or shortness of breath.  No changes to urinary or bowel function. See HPI.  ROS      Past Medical History:   Diagnosis Date   • Adrenal insufficiency (HCC) 6/13/2016   • Cataract     r, early stage   • Compression fracture of cervical spine (HCC) 6/13/2016   • Flu     03-   • Myasthenia gravis ([facility]) 2013   • Pain 03-    back, 4/10   • Subclinical hypothyroidism 6/13/2016     Social History     Tobacco Use   • Smoking status: Never Smoker   • Smokeless tobacco: Never Used   Substance Use Topics   • Alcohol use: No     Alcohol/week: 0.0 oz   • Drug use: No     Current Outpatient Medications   Medication Sig Dispense Refill   • diclofenac sodium  "(VOLTAREN) 1 % Gel Apply 1 g topically 3 times a day as needed. 350 g 6   • ezetimibe (ZETIA) 10 MG Tab Take 1 Tablet by mouth every day. 90 Tablet 3   • pyridostigmine (MESTINON) 60 MG Tab Take 60 mg by mouth 3 times a day. Indications: Myasthenia Gravis     • azathioprine (IMURAN) 25 MG Tab Take 50 mg by mouth every day.     • Flaxseed, Linseed, (FLAXSEED OIL PO) Take 1 Tab by mouth every day.     • B Complex Vitamins (B COMPLEX 1 PO) Take 1 Tab by mouth every day.     • Cholecalciferol (VITAMIN D) 2000 UNITS Cap Take 1 Cap by mouth every day.     • CALCIUM PO Take 1 Tab by mouth every day.       No current facility-administered medications for this visit.       Physical Exam:  /93 (BP Location: Left arm, Patient Position: Sitting, BP Cuff Size: Adult)   Pulse 64   Temp 36.2 °C (97.2 °F) (Temporal)   Ht 1.6 m (5' 3\")   Wt 65.2 kg (143 lb 12.8 oz)   SpO2 94%   BMI 25.47 kg/m²   General: Well developed, well nourished male, in no distress.  Eyes: Conjuntiva without any obvious injection or erythema.   Cardiovascular: Heart is regular with no murmur  Lungs: Clear to auscultation bilaterally. No wheezes, rhonci or crackles heard. Respiratory effort is normal.  Abd: Soft, non-tender  Ext: No edema    Assessment and Plan:   Today, I referred Mr. Ivy to physical therapy to continue to work on his hip and back pain.Refilled Voltaren for arthritis.   Given that he has been on 5 years of bisphosphonate treatment with stable DEXA scans, Fosamax was discontinued. His GERD has improved and cough has resolved.     Diagnoses and all orders for this visit:  Arthritis, hip  -     Referral to Physical Therapy  -     diclofenac sodium (VOLTAREN) 1 % Gel; Apply 1 g topically 3 times a day as needed.  Chronic bilateral low back pain without sciatica  -     Referral to Physical Therapy  -     diclofenac sodium (VOLTAREN) 1 % Gel; Apply 1 g topically 3 times a day as needed.  Gastroesophageal reflux disease without " esophagitis      Follow up in 6mo or PRN to re-establish with new PCP resident  Rosario Servin D.O PGY III  [facility]

## 2022-03-04 PROBLEM — K21.9 GASTROESOPHAGEAL REFLUX DISEASE WITHOUT ESOPHAGITIS: Status: ACTIVE | Noted: 2022-03-04

## 2022-06-23 ENCOUNTER — OFFICE VISIT (OUTPATIENT)
Dept: INTERNAL MEDICINE | Facility: OTHER | Age: 62
End: 2022-06-23
Payer: COMMERCIAL

## 2022-06-23 VITALS
HEIGHT: 63 IN | HEART RATE: 67 BPM | WEIGHT: 142 LBS | TEMPERATURE: 97.1 F | OXYGEN SATURATION: 95 % | BODY MASS INDEX: 25.16 KG/M2 | SYSTOLIC BLOOD PRESSURE: 108 MMHG | DIASTOLIC BLOOD PRESSURE: 71 MMHG

## 2022-06-23 DIAGNOSIS — R05.3 CHRONIC COUGH: ICD-10-CM

## 2022-06-23 DIAGNOSIS — L57.0 ACTINIC KERATOSIS OF FOREHEAD: ICD-10-CM

## 2022-06-23 PROCEDURE — 99213 OFFICE O/P EST LOW 20 MIN: CPT | Mod: GC | Performed by: STUDENT IN AN ORGANIZED HEALTH CARE EDUCATION/TRAINING PROGRAM

## 2022-06-23 RX ORDER — FAMOTIDINE 20 MG/1
20 TABLET, FILM COATED ORAL
COMMUNITY

## 2022-06-23 ASSESSMENT — ENCOUNTER SYMPTOMS
HEARTBURN: 0
BLURRED VISION: 0
VOMITING: 0
WEAKNESS: 0
SINUS PAIN: 0
DIZZINESS: 0
ABDOMINAL PAIN: 0
CHILLS: 0
SORE THROAT: 0
COUGH: 0
HEADACHES: 0
NAUSEA: 0
DOUBLE VISION: 0
PALPITATIONS: 0
FALLS: 0
SHORTNESS OF BREATH: 0
WHEEZING: 0
FEVER: 0
MYALGIAS: 0

## 2022-06-23 ASSESSMENT — PATIENT HEALTH QUESTIONNAIRE - PHQ9: CLINICAL INTERPRETATION OF PHQ2 SCORE: 0

## 2022-06-23 NOTE — PROGRESS NOTES
Established Patient    Patient Care Team:  Jory Ramriez M.D. as PCP - General (Internal Medicine)    HPI:  Wang Ivy is a 62 y.o. male with a past medical history of myasthenia gravis (on Imuran and Mestinon, follows with neurology), DLD, arthritis with back and hip pain, and chronic cough who presents today with the following Chief Complaint(s):   Chief Complaint   Patient presents with   • Gastrophageal Reflux   • Arthritis     Patient here to re-establish gerd and arthritis-lab review     Patient was previously seen by Dr. Servin, last seen in clinic 3/2/2022.    He notes that he has had a chronic cough x years, which he feels has progressed in the last few months. He states that he notices that he coughs, mainly at night, when eating and drinking cold water. He does not note dysphagia, odynophagia, feeling of food getting stuck in his throat/going down the wrong pipe or choking. Patient states that he was told this cough could be due to GERD or postnasal drip, but denies heartburn symptoms or runny nose/watery eyes, or wheezing. He states that when he wakes up he feels phlegm in his throat but otherwise denies feeling nasal drip in the back of his throat. Patient notes a temporal relationship of decreasing his Imuran (from 3 pills to 1 pill) with progression of the cough.    Patient also reports noticing a rough, scaly spot on the top of his head. He states that it comes and goes, wherein he is able to scratch it off but then would recur later on. 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    Chronic problems:  Myasthenia gravis - on Imuran and Mestinon. Previously seen by Dr. Taylor, now being seen by Dr. Obando and follows regularly.  Dyslipidemia - On Zetia 10mg daily, not on statin, notes possible myalgias. Consider re-discussing statin given ASCVD risk 10.9%.    Allergies on file: PCN and meropenem - per patient, he states that his father had an allergic  reaction, but unsure if he personally has allergies to those medications.  Family history: heart disease in mother (diagnosed at 71 y/o), brother (diagnosed at 64y/o), diabetes in mother and brother, thyroid disease in sister  Social history: Works in construction. Able to do activities of daily living independently. Denies smoking, alcohol intake, or recreational drug use.      Review of Systems   Review of Systems   Constitutional: Negative for chills, fever and malaise/fatigue.   HENT: Negative for congestion, sinus pain and sore throat.    Eyes: Negative for blurred vision and double vision.   Respiratory: Negative for cough, shortness of breath and wheezing.    Cardiovascular: Negative for chest pain, palpitations and leg swelling.   Gastrointestinal: Negative for abdominal pain, heartburn, nausea and vomiting.   Genitourinary: Negative for dysuria and urgency.   Musculoskeletal: Negative for falls and myalgias.   Skin: Negative for itching.        Rough patch along top of head   Neurological: Negative for dizziness, weakness and headaches.   Endo/Heme/Allergies: Negative for environmental allergies.       Past Medical History:   Diagnosis Date   • Adrenal insufficiency ([facility]) 6/13/2016   • Cataract     r, early stage   • Compression fracture of cervical spine ([facility]) 6/13/2016   • Flu     03-   • Myasthenia gravis ([facility]) 2013   • Pain 03-    back, 4/10   • Subclinical hypothyroidism 6/13/2016       Patient Active Problem List    Diagnosis Date Noted   • Gastroesophageal reflux disease without esophagitis 03/04/2022   • Arthritis, hip 07/09/2021   • Stearns's neuroma of right foot 05/23/2019   • Myalgia 11/09/2017   • Drug-induced osteoporosis 12/20/2016   • Impaired fasting blood sugar 10/21/2016   • Dyslipidemia 10/21/2016   • Subclinical hypothyroidism 06/13/2016   • Compression fracture of cervical spine ([facility]) 06/13/2016   • Osteoporosis with fracture 05/25/2016   • MG (myasthenia gravis) ([facility])  "03/10/2016   • Chronic back pain 03/10/2016       Allergies:Cillins [penicillins] and Merrem [meropenem]    Current Outpatient Medications   Medication Sig Dispense Refill   • famotidine (PEPCID) 20 MG Tab Take 20 mg by mouth 1 time a day as needed.     • diclofenac sodium (VOLTAREN) 1 % Gel Apply 1 g topically 3 times a day as needed. 350 g 6   • ezetimibe (ZETIA) 10 MG Tab Take 1 Tablet by mouth every day. 90 Tablet 3   • pyridostigmine (MESTINON) 60 MG Tab Take 60 mg by mouth 3 times a day. Indications: Myasthenia Gravis     • azathioprine (IMURAN) 25 MG Tab Take 50 mg by mouth every day.     • Flaxseed, Linseed, (FLAXSEED OIL PO) Take 1 Tab by mouth every day.     • B Complex Vitamins (B COMPLEX 1 PO) Take 1 Tab by mouth every day.     • Cholecalciferol (VITAMIN D) 2000 UNITS Cap Take 1 Cap by mouth every day.     • CALCIUM PO Take 1 Tab by mouth every day.       No current facility-administered medications for this visit.       Social History     Tobacco Use   • Smoking status: Never Smoker   • Smokeless tobacco: Never Used   Vaping Use   • Vaping Use: Never used   Substance Use Topics   • Alcohol use: No     Alcohol/week: 0.0 oz   • Drug use: No       Family History   Problem Relation Age of Onset   • Heart Attack Mother    • Heart Attack Father          Physical Exam  Vitals:  /71 (BP Location: Left arm, Patient Position: Sitting, BP Cuff Size: Large adult)   Pulse 67   Temp 36.2 °C (97.1 °F) (Temporal)   Ht 1.6 m (5' 3\")   Wt 64.4 kg (142 lb)   SpO2 95%  Body mass index is 25.15 kg/m².  Constitutional:  Not in acute distress, well appearing.  HEENT:   NC/AT, hearing grossly intact  Cardiovascular: Regular rate and rhythm. No murmurs or gallops.      Lungs:   Clear to auscultation bilaterally. No wheezes or crackles.  Abdomen: Not distended, soft, not tender to palpation  Extremities:  No cyanosis/clubbing/edema.  Skin:  Warm and dry. Presence of circumscribed mildly raised, rough lesion along top " of head  Neurologic: Alert & oriented x 3, CN II-XII grossly intact, strength and sensation grossly intact.  No focal deficits noted.  Psychiatric:  Affect and mood normal    Assessment and Plan:     1. Chronic cough  Patient notes cough x years, that has progressed with temporal relation to decrease in azathioprine dose  Reports worsening of cough at night, when eating any type of food and drinking cold water  Denies dysphagia, odynophagia, feeling of food getting stuck in his throat/going down the wrong pipe or choking  Considered GERD or postnasal drip, but denies heartburn symptoms or allergy symptoms. No history of asthma  -Advised patient to reach out to Dr. Obando, who follows him for MG. Consider this symptom related to MG with recent progression and decrease in medication. Consider swallow evaluation to further assess for oropharyngeal vs esophageal etiologies contributing to symptoms  -Advised to drink room temp/warm water, and to take small bites, chew food thoroughly and eat slowly  -Stop famotidine given patient not complaining of heartburn/GERD    2. 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    Please note that this dictation was created using voice recognition software. I have made every reasonable attempt to correct obvious errors, but I expect that there are errors of grammar and possibly content that I did not discover before finalizing the note.    Total face-to-face time spent in medical decision makinmin    Jory Ramirez M.D. PGY-1  [employer]

## 2022-06-23 NOTE — PROGRESS NOTES
"Teaching Physician Attestation      Level of Participation    I have personally interviewed and examined the patient.  In addition, I discussed with the resident physician the patient's history, exam, assessment and plan in detail.  Topics listed in my addendum were the focus of the visit.  Healthcare maintenance was not addressed this visit unless listed as a topic in my addendum.  I agree with the plan as written along with the following additions/modifications:    Establishing with New Resident PCP      PM: Chronic cough, back/hip pain, MG followed by neuro, steroid induced osteoporosis          Cough when eating, possibly related to his Myasthenia Gravis  -Newly occurred, specifically only seems to occur at night with food and cold liquid.  Of note, denies heartburn, denies wheezing, no history of asthma or smoking, denies allergy symptoms with exception of \"increased mucus\" when he first wakes up in the mornings.  Did recently reduce his myasthenia gravis medication.    Plan  -basic aspriation precautions reviewed  -Reached out to his neurologist directly to make aware of symptoms, appreciate support.  Given temporal relationship to decrease in his myasthenia medication could potentially improve with increase to prior dosing, but again will defer to neurology.  -Any significant worsening or trouble breathing he is to go to the emergency room immediately    Scalp lesion, likely actinic keratosis  -Sunscreen recommened (has freq sun exposure), Derm referral.  Appreciate support    Return to clinic in approximately 1 month for annual exam, at that time also need to review his calcium intake related to osteoporosis/review need for supplementation  "

## 2022-06-23 NOTE — PATIENT INSTRUCTIONS
It was great meeting you today Wang!  Please wear sunblock and reapply regularly  I have sent a referral to dermatology, please wait for them to reach out to you  Try out a nasal spray in the morning and humidifier at night  Try drinking warm or room temperature water, eat slowly and chew thoroughly and take small bites  Please follow up in 1-2 months for an annual wellness visit      Dyslipidemia  Dyslipidemia is an imbalance of waxy, fat-like substances (lipids) in the blood. The body needs lipids in small amounts. Dyslipidemia often involves a high level of cholesterol or triglycerides, which are types of lipids.  Common forms of dyslipidemia include:  High levels of LDL cholesterol. LDL is the type of cholesterol that causes fatty deposits (plaques) to build up in the blood vessels that carry blood away from your heart (arteries).  Low levels of HDL cholesterol. HDL cholesterol is the type of cholesterol that protects against heart disease. High levels of HDL remove the LDL buildup from arteries.  High levels of triglycerides. Triglycerides are a fatty substance in the blood that is linked to a buildup of plaques in the arteries.  What are the causes?  Primary dyslipidemia is caused by changes (mutations) in genes that are passed down through families (inherited). These mutations cause several types of dyslipidemia.  Secondary dyslipidemia is caused by lifestyle choices and diseases that lead to dyslipidemia, such as:  Eating a diet that is high in animal fat.  Not getting enough exercise.  Having diabetes, kidney disease, liver disease, or thyroid disease.  Drinking large amounts of alcohol.  Using certain medicines.  What increases the risk?  You are more likely to develop this condition if you are an older man or if you are a woman who has gone through menopause. Other risk factors include:  Having a family history of dyslipidemia.  Taking certain medicines, including birth control pills, steroids, some  diuretics, and beta-blockers.  Smoking cigarettes.  Eating a high-fat diet.  Having certain medical conditions such as diabetes, polycystic ovary syndrome (PCOS), kidney disease, liver disease, or hypothyroidism.  Not exercising regularly.  Being overweight or obese with too much belly fat.  What are the signs or symptoms?  In most cases, dyslipidemia does not usually cause any symptoms.  In severe cases, very high lipid levels can cause:  Fatty bumps under the skin (xanthomas).  White or gray ring around the black center (pupil) of the eye.  Very high triglyceride levels can cause inflammation of the pancreas (pancreatitis).  How is this diagnosed?    Your health care provider may diagnose dyslipidemia based on a routine blood test (fasting blood test). Because most people do not have symptoms of the condition, this blood testing (lipid profile) is done on adults age 20 and older and is repeated every 5 years. This test checks:  Total cholesterol. This measures the total amount of cholesterol in your blood, including LDL cholesterol, HDL cholesterol, and triglycerides. A healthy number is below 200.  LDL cholesterol. The target number for LDL cholesterol is different for each person, depending on individual risk factors. Ask your health care provider what your LDL cholesterol should be.  HDL cholesterol. An HDL level of 60 or higher is best because it helps to protect against heart disease. A number below 40 for men or below 50 for women increases the risk for heart disease.  Triglycerides. A healthy triglyceride number is below 150.  If your lipid profile is abnormal, your health care provider may do other blood tests.  How is this treated?  Treatment depends on the type of dyslipidemia that you have and your other risk factors for heart disease and stroke. Your health care provider will have a target range for your lipid levels based on this information.  For many people, this condition may be treated by  lifestyle changes, such as diet and exercise. Your health care provider may recommend that you:  Get regular exercise.  Make changes to your diet.  Quit smoking if you smoke.  If diet changes and exercise do not help you reach your goals, your health care provider may also prescribe medicine to lower lipids. The most commonly prescribed type of medicine lowers your LDL cholesterol (statin drug). If you have a high triglyceride level, your provider may prescribe another type of drug (fibrate) or an omega-3 fish oil supplement, or both.  Follow these instructions at home:    Eating and drinking  Follow instructions from your health care provider or dietitian about eating or drinking restrictions.  Eat a healthy diet as told by your health care provider. This can help you reach and maintain a healthy weight, lower your LDL cholesterol, and raise your HDL cholesterol. This may include:  Limiting your calories, if you are overweight.  Eating more fruits, vegetables, whole grains, fish, and lean meats.  Limiting saturated fat, trans fat, and cholesterol.  If you drink alcohol:  Limit how much you use.  Be aware of how much alcohol is in your drink. In the [place] one drink equals one 12 oz bottle of beer (355 mL), one 5 oz glass of wine (148 mL), or one 1½ oz glass of hard liquor (44 mL).  Do not drink alcohol if:  Your health care provider tells you not to drink.  You are pregnant, may be pregnant, or are planning to become pregnant.  Activity  Get regular exercise. Start an exercise and strength training program as told by your health care provider. Ask your health care provider what activities are safe for you. Your health care provider may recommend:  30 minutes of aerobic activity 4-6 days a week. Brisk walking is an example of aerobic activity.  Strength training 2 days a week.  General instructions  Do not use any products that contain nicotine or tobacco, such as cigarettes, e-cigarettes, and chewing tobacco. If you  need help quitting, ask your health care provider.  Take over-the-counter and prescription medicines only as told by your health care provider. This includes supplements.  Keep all follow-up visits as told by your health care provider.  Contact a health care provider if:  You are:  Having trouble sticking to your exercise or diet plan.  Struggling to quit smoking or control your use of alcohol.  Summary  Dyslipidemia often involves a high level of cholesterol or triglycerides, which are types of lipids.  Treatment depends on the type of dyslipidemia that you have and your other risk factors for heart disease and stroke.  For many people, treatment starts with lifestyle changes, such as diet and exercise.  Your health care provider may prescribe medicine to lower lipids.  This information is not intended to replace advice given to you by your health care provider. Make sure you discuss any questions you have with your health care provider.  Document Released: 12/23/2014 Document Revised: 08/12/2019 Document Reviewed: 07/19/2019  [employer] Patient Education © 2020 [employer]

## 2022-08-09 ENCOUNTER — OFFICE VISIT (OUTPATIENT)
Dept: INTERNAL MEDICINE | Facility: OTHER | Age: 62
End: 2022-08-09
Payer: COMMERCIAL

## 2022-08-09 VITALS
TEMPERATURE: 97.2 F | DIASTOLIC BLOOD PRESSURE: 66 MMHG | HEART RATE: 58 BPM | SYSTOLIC BLOOD PRESSURE: 104 MMHG | WEIGHT: 145.4 LBS | HEIGHT: 64 IN | OXYGEN SATURATION: 96 % | BODY MASS INDEX: 24.82 KG/M2

## 2022-08-09 DIAGNOSIS — E78.5 DYSLIPIDEMIA: ICD-10-CM

## 2022-08-09 DIAGNOSIS — K21.9 GASTROESOPHAGEAL REFLUX DISEASE WITHOUT ESOPHAGITIS: ICD-10-CM

## 2022-08-09 DIAGNOSIS — Z12.11 COLON CANCER SCREENING: ICD-10-CM

## 2022-08-09 DIAGNOSIS — G89.29 CHRONIC LEFT-SIDED LOW BACK PAIN WITHOUT SCIATICA: ICD-10-CM

## 2022-08-09 DIAGNOSIS — Z00.00 HEALTHCARE MAINTENANCE: ICD-10-CM

## 2022-08-09 DIAGNOSIS — M54.50 CHRONIC LEFT-SIDED LOW BACK PAIN WITHOUT SCIATICA: ICD-10-CM

## 2022-08-09 DIAGNOSIS — G70.00 MYASTHENIA GRAVIS (HCC): ICD-10-CM

## 2022-08-09 PROCEDURE — G0439 PPPS, SUBSEQ VISIT: HCPCS | Mod: GE | Performed by: STUDENT IN AN ORGANIZED HEALTH CARE EDUCATION/TRAINING PROGRAM

## 2022-08-09 RX ORDER — AZATHIOPRINE 50 MG/1
150 TABLET ORAL DAILY
COMMUNITY
Start: 2022-07-22 | End: 2022-12-13

## 2022-08-09 ASSESSMENT — PATIENT HEALTH QUESTIONNAIRE - PHQ9: CLINICAL INTERPRETATION OF PHQ2 SCORE: 0

## 2022-08-09 NOTE — PATIENT INSTRUCTIONS
Great seeing you today Wang, thank you for coming in!  You're doing great overall!  When you get your labs done, please get: A1c (check for diabetes), PSA (prostate), and Hepatitis C (I will also send a lab with you just in case you can't get it done at [facility]) - please have your labs sent to us. If there are any abnormalities, I will give you a call  I have sent a referral to GI for your colonoscopy. Please wait to hear from them  Check out [employer] for your immunizations - you are due for Shingles and pneumococcal. You can get them at your local pharmacy  Follow up in 1 year or sooner if needed

## 2022-08-09 NOTE — PROGRESS NOTES
HPI:  Wang is a 62 y.o. here with PMH of myasthenia gravis (on Imuran and Mestinon, follows with neurology), DLD, arthritis of hip, chronic bilateral low back pain without sciatica, and chronic cough for Annual Wellness Visit     Patient is doing well overall today, no acute complaints at this time. He does note lower back pain that is ongoing, unchanged and without alarm symptoms - uses topical medication to aid in symptoms. He also reports double vision at the end of the day, which is not new with him, likely from his MG. He is following closely with neurology, Dr. Obando, and currently on Imuran and Mestinon daily.     No family history of colorectal or prostate cancer. He denies any urinary symptoms such as difficulty initiating stream, dribbling, dysuria, hematuria, hematochezia/melena/changes in bowel or eating habits, or weight loss. Interested in prostate screening and colonoscopy - gets labs at [facility].  Also for diabetes and Hepatitis C screening, reports STD testing in his 30s.          Patient Active Problem List    Diagnosis Date Noted   • Gastroesophageal reflux disease without esophagitis 03/04/2022   • Arthritis, hip 07/09/2021   • Stearns's neuroma of right foot 05/23/2019   • Myalgia 11/09/2017   • Drug-induced osteoporosis 12/20/2016   • Impaired fasting blood sugar 10/21/2016   • Dyslipidemia 10/21/2016   • Subclinical hypothyroidism 06/13/2016   • Compression fracture of cervical spine ([facility]) 06/13/2016   • Osteoporosis with fracture 05/25/2016   • MG (myasthenia gravis) ([facility]) 03/10/2016   • Chronic back pain 03/10/2016       Current Outpatient Medications   Medication Sig Dispense Refill   • azaTHIOprine (IMURAN) 50 MG Tab Take 150 mg by mouth every day.     • famotidine (PEPCID) 20 MG Tab Take 20 mg by mouth 1 time a day as needed.     • diclofenac sodium (VOLTAREN) 1 % Gel Apply 1 g topically 3 times a day as needed. 350 g 6   • ezetimibe (ZETIA) 10 MG Tab Take 1 Tablet by mouth every  day. 90 Tablet 3   • pyridostigmine (MESTINON) 60 MG Tab Take 60 mg by mouth 3 times a day. Indications: Myasthenia Gravis     • Flaxseed, Linseed, (FLAXSEED OIL PO) Take 1 Tab by mouth every day.     • B Complex Vitamins (B COMPLEX 1 PO) Take 1 Tab by mouth every day.     • Cholecalciferol (VITAMIN D) 2000 UNITS Cap Take 1 Cap by mouth every day.     • CALCIUM PO Take 1 Tab by mouth every day.     • azathioprine (IMURAN) 25 MG Tab Take 50 mg by mouth every day. (Patient not taking: Reported on 8/9/2022)       No current facility-administered medications for this visit.            Current supplements as per medication list.       Allergies: Cillins [penicillins] and Merrem [meropenem]    Immunizations:  Has not had flu vaccine since childhood, not interested in pneumococcal vaccine at this time  Reports last tetanus shot around 5-6 years ago, after stepping on a nail  Is hesitant at Shingles at this time, said he had chicken pox as a child  Received COVID (Pfizer) 4/28/21, 5/19/21 - not interested in booster at this time    Current social contact/activities:  Walks daily, enjoys going to PT regularly which helps with back pain and muscles    He  reports that he has never smoked. He has never used smokeless tobacco. He reports that he does not drink alcohol and does not use drugs.  Counseling given: Not Answered      DPA/Advanced directive: Patient does have an advanced directive. If not on file, instructed to bring in a copy to scan into their chart. If no advanced directive exists, a packet and workshop information was given on advanced directives. - Will bring in copy next visit    ROS:    Gait: Uses no assistive device   Ostomy: no   Other tubes: no   Amputations: no   Chronic oxygen use no   Last eye exam: July 2021, repeat next month  : Denies incontinence.       Screening:    Depression Screening  Little interest or pleasure in doing things?  No  Feeling down, depressed , or hopeless? No  Patient Health  Questionnaire Score: 0  If depressive symptoms identified deferred to follow up visit unless specifically addressed in assessment and plan.    Interpretation of PHQ-9 Total Score   Score Severity   1-4 No Depression   5-9 Mild Depression   10-14 Moderate Depression   15-19 Moderately Severe Depression   20-27 Severe Depression    Screening for Cognitive Impairment  Three Minute Recall (daughter, heaven, mountain) 1/3  Saurav clock face with all 12 numbers and set the hands to show 10 past 11. -Yes  Cognitive concerns identified deferred for follow up unless specifically addressed in assessment and plan.    Fall Risk Assessment  Has the patient had two or more falls in the last year or any fall with injury in the last year? None    Safety Assessment  Throw rugs on floor. - No  Handrails on all stairs. - No  Good lighting in all hallways. - Yes  Difficulty hearing.  - No  Patient counseled about all safety risks that were identified.    Functional Assessment ADLs  Are there any barriers preventing you from cooking for yourself or meeting nutritional needs? -No  Are there any barriers preventing you from driving safely or obtaining transportation? -No  Are there any barriers preventing you from using a telephone or calling for help? -No  Are there any barriers preventing you from shopping? -No  Are there any barriers preventing you from taking care of your own finances?  -No  Are there any barriers preventing you from managing your medications? -No  Are there any barriers preventing you from showering, bathing or dressing yourself? -No  Are you currently engaging in any exercise or physical activity? -No   What is your perception of your health? -Doing very well    Health Maintenance Summary          Overdue - HEPATITIS C SCREENING (Once) Overdue - never done    No completion history exists for this topic.          Overdue - IMM PNEUMOCOCCAL VACCINE: 0-64 Years (1 - PCV) Overdue - never done    No completion history  exists for this topic.          Overdue - IMM DTaP/Tdap/Td Vaccine (1 - Tdap) Overdue - never done    No completion history exists for this topic.          Overdue - IMM ZOSTER VACCINES (1 of 2) Overdue - never done    No completion history exists for this topic.          Overdue - COVID-19 Vaccine (3 - Pfizer risk series) Overdue since 6/16/2021 05/19/2021  Imm Admin: PFIZER PURPLE CAP SARS-COV-2 VACCINATION (12+)    04/28/2021  Imm Admin: PFIZER PURPLE CAP SARS-COV-2 VACCINATION (12+)          IMM INFLUENZA (1) Next due on 9/1/2022    No completion history exists for this topic.          COLORECTAL CANCER SCREENING (COLONOSCOPY - Every 5 Years) Next due on 1/4/2023 01/04/2018  REFERRAL TO GI FOR COLONOSCOPY          BONE DENSITY (Every 5 Years) Next due on 7/22/2026 07/22/2021  DS-BONE DENSITY STUDY (DEXA)    04/27/2018  DS-BONE DENSITY STUDY (DEXA)    04/27/2018  DS-BONE DENSITY STUDY (DEXA)    05/13/2016  DS-BONE DENSITY STUDY (DEXA)    05/13/2016  DS-BONE DENSITY STUDY (DEXA)          IMM HEP B VACCINE (Series Information) Aged Out    No completion history exists for this topic.          IMM MENINGOCOCCAL ACWY VACCINE (Series Information) Aged Out    No completion history exists for this topic.                Patient Care Team:  Jory Ramirez M.D. as PCP - General (Internal Medicine)      Social History     Tobacco Use   • Smoking status: Never Smoker   • Smokeless tobacco: Never Used   Vaping Use   • Vaping Use: Never used   Substance Use Topics   • Alcohol use: No     Alcohol/week: 0.0 oz   • Drug use: No     Family History   Problem Relation Age of Onset   • Heart Attack Mother    • Heart Attack Father      He  has a past medical history of Adrenal insufficiency (HCC) (6/13/2016), Cataract, Compression fracture of cervical spine (HCC) (6/13/2016), Flu, Myasthenia gravis (HCC) (2013), Pain (03-), and Subclinical hypothyroidism (6/13/2016).   Past Surgical History:   Procedure  "Laterality Date   • KYPHOPLASTY N/A 4/4/2016    Procedure: KYPHOPLASTY L2,3,4;  Surgeon: Manan Larsen M.D.;  Location: SURGERY [facility];  Service:    • CYST EXCISION Left     ganglion       Exam:     /66 (BP Location: Left arm, Patient Position: Sitting, BP Cuff Size: Adult)   Pulse (!) 58   Temp 36.2 °C (97.2 °F) (Temporal)   Ht 1.626 m (5' 4\")   Wt 66 kg (145 lb 6.4 oz)   SpO2 96%  Body mass index is 24.96 kg/m².    Hearing excellent.    Dentition good  Alert, oriented in no acute distress.  Eye contact is good, speech goal directed, affect calm  Regular rate and rhythm, no murmurs  CTAB, no wheezing    Assessment and Plan. The following treatment and monitoring plan is recommended:   1. MG (myasthenia gravis) (HCC)     2. Chronic left-sided low back pain without sciatica     3. Dyslipidemia     4. Gastroesophageal reflux disease without esophagitis         Services needed: No services needed at this time  Health Care Screening recommendations as per orders if indicated.  Referrals offered: PT/OT/Nutrition counseling/Behavioral Health/Smoking cessation as per orders if indicated.    Discussion today about general wellness and lifestyle habits:    · Prevent falls and reduce trip hazards; Cautioned about securing or removing rugs.  · Have a working fire alarm and carbon monoxide detector;   · Engage in regular physical activity and social activities       Follow-up: Return in about 1 year (around 8/9/2023), or if symptoms worsen or fail to improve.    Jory Ramirez M.D. PGY-2  [facility]    "

## 2022-08-15 ENCOUNTER — TELEPHONE (OUTPATIENT)
Dept: INTERNAL MEDICINE | Facility: OTHER | Age: 62
End: 2022-08-15
Payer: COMMERCIAL

## 2022-08-15 NOTE — TELEPHONE ENCOUNTER
Pt called upset stating he has been calling since last week trying to get an MTM for filled out by us for his appointment in [place] on 08/22/2022. Informed him we have not received any forms in the fax. Per pt MTM told him we can print it online since we are the received the fax they have sent multiple times. Form is called Distance Verification.     I went to [facility] website and tried to print but the only option was to submit online no print option. I submitted the information and requested pt call MTM to verify they received it. Pt understood

## 2022-12-13 ENCOUNTER — OFFICE VISIT (OUTPATIENT)
Dept: INTERNAL MEDICINE | Facility: OTHER | Age: 62
End: 2022-12-13
Payer: COMMERCIAL

## 2022-12-13 VITALS
DIASTOLIC BLOOD PRESSURE: 66 MMHG | HEART RATE: 68 BPM | HEIGHT: 64 IN | BODY MASS INDEX: 24.59 KG/M2 | SYSTOLIC BLOOD PRESSURE: 112 MMHG | WEIGHT: 144 LBS | TEMPERATURE: 98.3 F | OXYGEN SATURATION: 96 %

## 2022-12-13 DIAGNOSIS — E78.5 DYSLIPIDEMIA: ICD-10-CM

## 2022-12-13 DIAGNOSIS — G70.00 MYASTHENIA GRAVIS (HCC): ICD-10-CM

## 2022-12-13 DIAGNOSIS — S46.912A MUSCLE STRAIN OF LEFT UPPER ARM, INITIAL ENCOUNTER: ICD-10-CM

## 2022-12-13 DIAGNOSIS — R35.1 NOCTURIA: ICD-10-CM

## 2022-12-13 PROCEDURE — 99213 OFFICE O/P EST LOW 20 MIN: CPT | Mod: GE | Performed by: STUDENT IN AN ORGANIZED HEALTH CARE EDUCATION/TRAINING PROGRAM

## 2022-12-13 RX ORDER — EZETIMIBE 10 MG/1
10 TABLET ORAL DAILY
Qty: 90 TABLET | Refills: 3 | Status: SHIPPED | OUTPATIENT
Start: 2022-12-13 | End: 2023-12-11 | Stop reason: SDUPTHER

## 2022-12-13 RX ORDER — TAMSULOSIN HYDROCHLORIDE 0.4 MG/1
0.4 CAPSULE ORAL
Qty: 30 CAPSULE | Refills: 0 | Status: SHIPPED | OUTPATIENT
Start: 2022-12-13 | End: 2023-03-07

## 2022-12-13 RX ORDER — SULFAMETHOXAZOLE AND TRIMETHOPRIM 800; 160 MG/1; MG/1
1 TABLET ORAL 2 TIMES DAILY
COMMUNITY
Start: 2022-10-25 | End: 2023-03-07

## 2022-12-13 NOTE — ASSESSMENT & PLAN NOTE
"Suspected muscle strain of L biceps/brachialis. NO recalled trauma, no mass/lesions. Exam and timeline do not support thrombophlebitis.  -counseled on diclofenac usage, \"restful exercise\" and stretches, counseled anticipated improvement over next 1-2 weeks.  -Follow up as needed  "

## 2022-12-13 NOTE — ASSESSMENT & PLAN NOTE
Reviewed recent [facility] labs. HDL 34, triglycerides 134. Total <180.  -Lipid panel recheck around 11/2023  -zetia 10mg PO qD

## 2022-12-13 NOTE — ASSESSMENT & PLAN NOTE
Suspected progression of probable BPH. Nocturia 1-2x nightly, with associated urinary urgency/hesitancy. Not significantly affecting lifestyle/sleep. Discussed monitoring, saw palmetto, flomax as initial trial interventions.  -Trial of saw palmetto  -Tamsulosin 0.4mg PO qHS (1 month trial)  -follow up as needed, or in 3 months

## 2022-12-13 NOTE — PROGRESS NOTES
CC:   Chief Complaint   Patient presents with    Follow-Up    Medication Refill     Zetia-90 day supply                                                                                                                                           HPI:   Wang presents today with the following.    Concerns of increased nocturia. Has been noticabel for the past month,  getting up 2x nightly, which is increased from 1x nightly previously. During the day, he does have urgency, hesitancy. Unsure, but symptoms of frequency/urgency seems to vary within the month. Notices coffee does make worse. Not significantly affecting sleep/tiredness.    L arm soreness near antecubital area. No trauma. Worse with pressure, might have decreased strength. Has some muscle fatigue. Unsure if different from myastheia weakness.     Recent changes with azathioprine (now taking 50mg PO BID (mid-day, evening)    Reviewed labs from [facility], A1c is 5.3, and triglycerides 148, HDL 34.    Patient Active Problem List    Diagnosis Date Noted    Nocturia 12/13/2022    Muscle strain of left upper arm 12/13/2022    Gastroesophageal reflux disease without esophagitis 03/04/2022    Arthritis, hip 07/09/2021    Stearns's neuroma of right foot 05/23/2019    Myalgia 11/09/2017    Drug-induced osteoporosis 12/20/2016    Impaired fasting blood sugar 10/21/2016    Dyslipidemia 10/21/2016    Subclinical hypothyroidism 06/13/2016    Compression fracture of cervical spine ([facility]) 06/13/2016    Osteoporosis with fracture 05/25/2016    MG (myasthenia gravis) ([facility]) 03/10/2016    Chronic back pain 03/10/2016       Current Outpatient Medications   Medication Sig Dispense Refill    sulfamethoxazole-trimethoprim (BACTRIM DS) 800-160 MG tablet Take 1 Tablet by mouth 2 times a day.      ezetimibe (ZETIA) 10 MG Tab Take 1 Tablet by mouth every day. 90 Tablet 3    tamsulosin (FLOMAX) 0.4 MG capsule Take 1 Capsule by mouth 1/2 hour after breakfast. 30 Capsule 0     "famotidine (PEPCID) 20 MG Tab Take 20 mg by mouth 1 time a day as needed.      diclofenac sodium (VOLTAREN) 1 % Gel Apply 1 g topically 3 times a day as needed. 350 g 6    pyridostigmine (MESTINON) 60 MG Tab Take 60 mg by mouth 3 times a day. Indications: Myasthenia Gravis      Flaxseed, Linseed, (FLAXSEED OIL PO) Take 1 Tab by mouth every day.      B Complex Vitamins (B COMPLEX 1 PO) Take 1 Tab by mouth every day.      Cholecalciferol (VITAMIN D) 2000 UNITS Cap Take 1 Cap by mouth every day.      CALCIUM PO Take 1 Tab by mouth every day.      azathioprine (IMURAN) 25 MG Tab Take 50 mg by mouth every day. (Patient not taking: Reported on 8/9/2022)       No current facility-administered medications for this visit.         Allergies as of 12/13/2022 - Reviewed 12/13/2022   Allergen Reaction Noted    Cillins [penicillins] Anaphylaxis 03/10/2016    Merrem [meropenem] Unspecified 03/10/2016          /66 (BP Location: Left arm, Patient Position: Sitting, BP Cuff Size: Adult)   Pulse 68   Temp 36.8 °C (98.3 °F) (Temporal)   Ht 1.626 m (5' 4\")   Wt 65.3 kg (144 lb)   SpO2 96%   BMI 24.72 kg/m²     ROS     Physical Exam:  Gen:  Alert and oriented, No apparent distress.  Neuro:  CN II-XII intact, no focal deficits  Lungs:  CTAB  CV:  RRR no m/g/r  Abd:  Soft, nontender, nondistended  Skin:  No acute rash/lesions   Ext:  Shoulder/elbow flexion/extension 5/5 bilaterally and symmetric; ambulates independently with steady gait.      Assessment and Plan.   Wang Ivy is a 62 y.o. male with the following issues:    MG (myasthenia gravis) ([facility])  Currently seeing Dr. Obando with neurology ([facility] retired?). Not currently having symptoms of fatigue, diplopia. Overall feeling like well controlled. Medication changes as follows per neurology:  -azathioprine (Imuran) 52mg PO BID  -Mestinon 60mg PO TID  -follow up neurology as directed    Dyslipidemia  Reviewed recent [facility] labs. HDL 34, triglycerides 134. Total " "<180.  -Lipid panel recheck around 11/2023  -zetia 10mg PO qD    Nocturia  Suspected progression of probable BPH. Nocturia 1-2x nightly, with associated urinary urgency/hesitancy. Not significantly affecting lifestyle/sleep. Discussed monitoring, saw palmetto, flomax as initial trial interventions.  -Trial of saw palmetto  -Tamsulosin 0.4mg PO qHS (1 month trial)  -follow up as needed, or in 3 months    Muscle strain of left upper arm  Suspected muscle strain of L biceps/brachialis. NO recalled trauma, no mass/lesions. Exam and timeline do not support thrombophlebitis.  -counseled on diclofenac usage, \"restful exercise\" and stretches, counseled anticipated improvement over next 1-2 weeks.  -Follow up as needed       Follow up 3-4months for preventative care, nocturia/BPH changes    "

## 2022-12-13 NOTE — ASSESSMENT & PLAN NOTE
Currently seeing Dr. Obando with neurology ([facility] retired?). Not currently having symptoms of fatigue, diplopia. Overall feeling like well controlled. Medication changes as follows per neurology:  -azathioprine (Imuran) 52mg PO BID  -Mestinon 60mg PO TID  -follow up neurology as directed

## 2023-03-07 ENCOUNTER — OFFICE VISIT (OUTPATIENT)
Dept: INTERNAL MEDICINE | Facility: OTHER | Age: 63
End: 2023-03-07
Payer: COMMERCIAL

## 2023-03-07 VITALS
TEMPERATURE: 97.2 F | WEIGHT: 141.6 LBS | BODY MASS INDEX: 24.17 KG/M2 | SYSTOLIC BLOOD PRESSURE: 119 MMHG | HEIGHT: 64 IN | OXYGEN SATURATION: 96 % | DIASTOLIC BLOOD PRESSURE: 82 MMHG | HEART RATE: 60 BPM

## 2023-03-07 DIAGNOSIS — Z12.11 COLON CANCER SCREENING: ICD-10-CM

## 2023-03-07 DIAGNOSIS — S46.912D MUSCLE STRAIN OF LEFT UPPER ARM, SUBSEQUENT ENCOUNTER: ICD-10-CM

## 2023-03-07 DIAGNOSIS — R09.81 CONGESTION OF NASAL SINUS: ICD-10-CM

## 2023-03-07 PROBLEM — M79.10 MYALGIA: Status: RESOLVED | Noted: 2017-11-09 | Resolved: 2023-03-07

## 2023-03-07 PROCEDURE — 99213 OFFICE O/P EST LOW 20 MIN: CPT | Mod: GC | Performed by: STUDENT IN AN ORGANIZED HEALTH CARE EDUCATION/TRAINING PROGRAM

## 2023-03-07 RX ORDER — AZATHIOPRINE 50 MG/1
100 TABLET ORAL
COMMUNITY
Start: 2023-02-21

## 2023-03-07 ASSESSMENT — PATIENT HEALTH QUESTIONNAIRE - PHQ9: CLINICAL INTERPRETATION OF PHQ2 SCORE: 0

## 2023-03-07 NOTE — PATIENT INSTRUCTIONS
Thank you for coming in today, Wang  Please try to use your left hand less with the drill as much as possible and try the arm exercises provided.   Please check out the pharmacy for a tennis elbow band  For your congestion, try using saline nasal spray in the morning along with over the counter allergy medicine daily. A humidifier at night may also help  I have sent a referral to GI for colonoscopy, please schedule that and get it done when you can  Go to your local pharmacy and ask about Shingles and Pneumococcal vaccines  Follow up in 3 months or sooner if needed

## 2023-03-07 NOTE — ASSESSMENT & PLAN NOTE
Ongoing x2 months, mostly at night  Denies fever, chills, sneezing, sinus pain/headaches, coughing or sputum production  Denies recent ill contacts  Recent travel to [facility] in January and stayed at a hotel where there was a reported Legionnaire outbreak in December, however, denies pneumonia-like symptoms or GI upset  Has tried an over-the-counter oral decongestant with some symptom relief  -Advised conservative management for now - saline nasal spray and over the counter allergy medicine daily. Also encouraged using a humidifier at night

## 2023-03-07 NOTE — PROGRESS NOTES
Established Patient    Patient Care Team:  Jory Ramirez M.D. as PCP - General (Internal Medicine)    HPI:  Wang Ivy is a 63 y.o. male with PMH of myasthenia gravis (on Imuran and Mestinon, follows with neurology), DLD, arthritis of hip, chronic bilateral low back pain without sciatica, and chronic cough who presents today with the following Chief Complaint(s):   Chief Complaint   Patient presents with    Congestion     For about 2 months    Arm Pain     Left arm     Patient comes in today reporting nasal congestion x 2 months, mostly at night. He denies fever, chills, sneezing, sinus pain/headaches, coughing or sputum production. Also denies any recent ill contacts. Patient recently travelled to [facility] in January and stayed at a hotel where there was a reported Legionnaire outbreak in December, however, denies pneumonia-like symptoms or GI upset. He has tried an over-the-counter oral decongestant with some symptom relief.   Other than that, patient last seen in Dec 2022 with L arm strain. He has tried Voltaren gel with some relief, but reports ongoing soreness. 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 He has tenderness to palpation along the medial and lateral epicondyle but otherwise denies numbness/tingling/paresthesias.    Review of Systems   Denies fever, chills, fatigue/malaise  Reports nasal congestion, denies sore throat, ear pain  Denies chest pain, palpitations  Denies cough, shortness of breath, wheezing  Denies heartburn, N/V, abdominal pain, changes in bowel habits  Denies dysuria, frequency, nocturia  Denies myalgias, back pain, falls. Reports L arm soreness with tenderness to lateral and medial epicondyles  Denies headaches, tingling, sensory changes, weakness  Denies depression, anxiety      Past Medical History:   Diagnosis Date    Adrenal insufficiency (HCC) 6/13/2016    Cataract     r, early  stage    Compression fracture of cervical spine ([facility]) 6/13/2016    Flu     03-    Myasthenia gravis ([facility]) 2013    Pain 03-    back, 4/10    Subclinical hypothyroidism 6/13/2016       Patient Active Problem List    Diagnosis Date Noted    Nocturia 12/13/2022    Muscle strain of left upper arm 12/13/2022    Gastroesophageal reflux disease without esophagitis 03/04/2022    Arthritis, hip 07/09/2021    Stearns's neuroma of right foot 05/23/2019    Myalgia 11/09/2017    Drug-induced osteoporosis 12/20/2016    Impaired fasting blood sugar 10/21/2016    Dyslipidemia 10/21/2016    Subclinical hypothyroidism 06/13/2016    Compression fracture of cervical spine ([facility]) 06/13/2016    Osteoporosis with fracture 05/25/2016    MG (myasthenia gravis) ([facility]) 03/10/2016    Chronic back pain 03/10/2016       Allergies:Cillins [penicillins] and Merrem [meropenem]    Current Outpatient Medications   Medication Sig Dispense Refill    azaTHIOprine (IMURAN) 50 MG Tab Take 100 mg by mouth every day.      ezetimibe (ZETIA) 10 MG Tab Take 1 Tablet by mouth every day. 90 Tablet 3    famotidine (PEPCID) 20 MG Tab Take 20 mg by mouth 1 time a day as needed.      diclofenac sodium (VOLTAREN) 1 % Gel Apply 1 g topically 3 times a day as needed. 350 g 6    pyridostigmine (MESTINON) 60 MG Tab Take 60 mg by mouth 3 times a day. Indications: Myasthenia Gravis      Flaxseed, Linseed, (FLAXSEED OIL PO) Take 1 Tab by mouth every day.      B Complex Vitamins (B COMPLEX 1 PO) Take 1 Tab by mouth every day.      Cholecalciferol (VITAMIN D) 2000 UNITS Cap Take 1 Cap by mouth every day.      CALCIUM PO Take 1 Tab by mouth every day.       No current facility-administered medications for this visit.       Social History     Tobacco Use    Smoking status: Never    Smokeless tobacco: Never   Vaping Use    Vaping Use: Never used   Substance Use Topics    Alcohol use: No     Alcohol/week: 0.0 oz    Drug use: No       Family History   Problem Relation  "Age of Onset    Heart Attack Mother     Heart Attack Father          Physical Exam  Vitals:  /82 (BP Location: Right arm, Patient Position: Sitting, BP Cuff Size: Adult)   Pulse 60   Temp 36.2 °C (97.2 °F) (Temporal)   Ht 1.626 m (5' 4\")   Wt 64.2 kg (141 lb 9.6 oz)   SpO2 96%  Body mass index is 24.31 kg/m².  Constitutional:  Not in acute distress, well appearing.  HEENT:   NC/AT. EOMI, conjunctiva normal, anicteric sclera, hearing grossly intact  Cardiovascular: Regular rate and rhythm. No murmurs   Lungs:   Clear to auscultation bilaterally. No wheezes or crackles.  Extremities:  Tenderness to palpation over medial and lateral epicondyles, negative Phalen's/Tinel  Skin:  Warm and dry.  No visible rashes.  Neurologic: Alert & oriented x 3, CN II-XII grossly intact, strength and sensation intact bilaterally. Gait normal.  Psychiatric:  Affect normal, mood normal, judgment normal.      Assessment and Plan:     Problem List Items Addressed This Visit       Muscle strain of left upper arm     Initially noted Dec 2022  Tried Voltaren gel with some relief, but reports ongoing soreness. 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 No trauma to area  -Continue Voltaren as needed  -Advised patient to decrease use of tools in L hand as much as possible and to go to pharmacy to get an elbow brace  -Patient provided with arm stretches/exercises  -Conservative management. CTM         Congestion of nasal sinus     Ongoing x2 months, mostly at night  Denies fever, chills, sneezing, sinus pain/headaches, coughing or sputum production  Denies recent ill contacts  Recent travel to [facility] in January and stayed at a hotel where there was a reported Legionnaire outbreak in December, however, denies " pneumonia-like symptoms or GI upset  Has tried an over-the-counter oral decongestant with some symptom relief  -Advised conservative management for now - saline nasal spray and over the counter allergy medicine daily. Also encouraged using a humidifier at night          Other Visit Diagnoses       Colon cancer screening        Relevant Orders    Referral to GI for Colonoscopy            #MARTHA  Has not had flu vaccine since childhood, not interested at this time  Not interested in pneumococcal vaccine at this time, willing to discuss further at a future visit  Uncertain of last tetanus shot, will go to pharmacy and get it as necessary  Willing to have Shingles vaccine, will go to pharmacy  Received COVID ([employer]) 4/28/21, 5/19/21 - not interested in booster   Uncertain of last colonoscopy, reported polyps. Referral to GI for colonoscopy ordered today  DEXA 7/22/21 - Osteopenia of R forearm (T -2.2), was on bisphosphonate therapy x5 years. DEXAs stable, drug holiday     Return in about 3 months (around 6/7/2023), or if symptoms worsen or fail to improve.    Please note that this dictation was created using voice recognition software. I have made every reasonable attempt to correct obvious errors, but I expect that there are errors of grammar and possibly content that I did not discover before finalizing the note.      Jory Ramirez M.D. PGY-2  [facility]

## 2023-03-07 NOTE — ASSESSMENT & PLAN NOTE
Initially noted Dec 2022  Tried Voltaren gel with some relief, but reports ongoing soreness. 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 No trauma to area  -Continue Voltaren as needed  -Advised patient to decrease use of tools in L hand as much as possible and to go to pharmacy to get an elbow brace  -Patient provided with arm stretches/exercises  -Conservative management. CTM

## 2023-04-18 ENCOUNTER — TELEPHONE (OUTPATIENT)
Dept: INTERNAL MEDICINE | Facility: OTHER | Age: 63
End: 2023-04-18
Payer: COMMERCIAL

## 2023-04-18 DIAGNOSIS — G89.29 CHRONIC BILATERAL LOW BACK PAIN WITHOUT SCIATICA: ICD-10-CM

## 2023-04-18 DIAGNOSIS — M54.50 CHRONIC BILATERAL LOW BACK PAIN WITHOUT SCIATICA: ICD-10-CM

## 2023-04-18 DIAGNOSIS — M16.10 ARTHRITIS, HIP: ICD-10-CM

## 2023-04-18 NOTE — TELEPHONE ENCOUNTER
Patient needs a refill of voltaren. He states he called yesterday regarding this but I do not show any call history. Please contact pharmacy regarding refill

## 2023-05-31 ENCOUNTER — OFFICE VISIT (OUTPATIENT)
Dept: INTERNAL MEDICINE | Facility: OTHER | Age: 63
End: 2023-05-31
Payer: COMMERCIAL

## 2023-05-31 VITALS
WEIGHT: 140 LBS | HEART RATE: 60 BPM | BODY MASS INDEX: 23.9 KG/M2 | SYSTOLIC BLOOD PRESSURE: 106 MMHG | OXYGEN SATURATION: 96 % | DIASTOLIC BLOOD PRESSURE: 67 MMHG | TEMPERATURE: 97.4 F | HEIGHT: 64 IN

## 2023-05-31 DIAGNOSIS — R53.83 OTHER FATIGUE: ICD-10-CM

## 2023-05-31 PROCEDURE — 3078F DIAST BP <80 MM HG: CPT | Performed by: STUDENT IN AN ORGANIZED HEALTH CARE EDUCATION/TRAINING PROGRAM

## 2023-05-31 PROCEDURE — 3074F SYST BP LT 130 MM HG: CPT | Performed by: STUDENT IN AN ORGANIZED HEALTH CARE EDUCATION/TRAINING PROGRAM

## 2023-05-31 PROCEDURE — 99213 OFFICE O/P EST LOW 20 MIN: CPT | Mod: GE | Performed by: STUDENT IN AN ORGANIZED HEALTH CARE EDUCATION/TRAINING PROGRAM

## 2023-05-31 ASSESSMENT — PATIENT HEALTH QUESTIONNAIRE - PHQ9: CLINICAL INTERPRETATION OF PHQ2 SCORE: 0

## 2023-05-31 NOTE — PROGRESS NOTES
Established Patient    Patient Care Team:  Jory Ramirez M.D. as PCP - General (Internal Medicine)    HPI:  Wang Ivy is a 63 y.o. male with PMH of myasthenia gravis (on Imuran and Mestinon, follows with neurology), DLD, arthritis of hip, chronic bilateral low back pain without sciatica, and chronic cough who presents today with the following Chief Complaint(s):   Chief Complaint   Patient presents with    Fatigue     Patients suspects he may have sleep apnea      Patient comes in today to discuss further work up of fatigue. Patient reports fatigue x1 year. Labs obtained by Dr. Obando, neurology, which did not reveal anemia and showed normal thyroid function and B12 levels. He states that when he wakes up, he does not have energy and gets tired throughout the day. He does not have a hard time falling or staying asleep and only wakes up 1x nightly to use the restroom (previously 2-3x). He sleeps around 7-8 hours nightly, does not wake up with a headache or take naps in the daytime. Other than that, he does not note muscle aches or weakness, and denies inattention, poor concentration, changes in mood.    Review of Systems   Constitutional: Denies chills, fever. Reports chronic fatigue  HEENT: Denies blurry vision, congestion, sore throat  Respiratory: Denies shortness of breath  Cardiovascular: Denies chest pain, palpitations  Gastrointestinal: Denies nausea, vomiting, abdominal pain  Genitourinary: Denies dysuria  Neurological: Negative for headaches      Past Medical History:   Diagnosis Date    Adrenal insufficiency ([facility]) 6/13/2016    Cataract     r, early stage    Compression fracture of cervical spine ([facility]) 6/13/2016    Flu     03-    Myasthenia gravis ([facility]) 2013    Pain 03-    back, 4/10    Subclinical hypothyroidism 6/13/2016       Patient Active Problem List    Diagnosis Date Noted    Congestion of nasal sinus 03/07/2023    Nocturia 12/13/2022    Muscle strain of left  "upper arm 12/13/2022    Gastroesophageal reflux disease without esophagitis 03/04/2022    Arthritis, hip 07/09/2021    Stearns's neuroma of right foot 05/23/2019    Drug-induced osteoporosis 12/20/2016    Impaired fasting blood sugar 10/21/2016    Dyslipidemia 10/21/2016    Subclinical hypothyroidism 06/13/2016    Compression fracture of cervical spine ([facility]) 06/13/2016    Pathological fracture due to osteoporosis 05/25/2016    MG (myasthenia gravis) ([facility]) 03/10/2016    Chronic back pain 03/10/2016       Allergies:Cillins [penicillins] and Merrem [meropenem]    Current Outpatient Medications   Medication Sig Dispense Refill    diclofenac sodium (VOLTAREN) 1 % Gel Apply 1 g topically 3 times a day as needed (pain). 350 g 6    azaTHIOprine (IMURAN) 50 MG Tab Take 100 mg by mouth 2 times a day.      ezetimibe (ZETIA) 10 MG Tab Take 1 Tablet by mouth every day. 90 Tablet 3    famotidine (PEPCID) 20 MG Tab Take 20 mg by mouth 1 time a day as needed.      pyridostigmine (MESTINON) 60 MG Tab Take 60 mg by mouth 3 times a day. Indications: Myasthenia Gravis      Flaxseed, Linseed, (FLAXSEED OIL PO) Take 1 Tab by mouth every day.      B Complex Vitamins (B COMPLEX 1 PO) Take 1 Tab by mouth every day.      Cholecalciferol (VITAMIN D) 2000 UNITS Cap Take 1 Cap by mouth every day.      CALCIUM PO Take 1 Tab by mouth every day.       No current facility-administered medications for this visit.       Social History     Tobacco Use    Smoking status: Never    Smokeless tobacco: Never   Vaping Use    Vaping Use: Never used   Substance Use Topics    Alcohol use: No     Alcohol/week: 0.0 oz    Drug use: No       Family History   Problem Relation Age of Onset    Heart Attack Mother     Heart Attack Father          Physical Exam  Vitals:  /67 (BP Location: Right arm, Patient Position: Sitting, BP Cuff Size: Adult)   Pulse 60   Temp 36.3 °C (97.4 °F) (Temporal)   Ht 1.626 m (5' 4\")   Wt 63.5 kg (140 lb)   SpO2 96%  Body mass " index is 24.03 kg/m².  Constitutional:  Not in acute distress, well appearing.  HEENT:   NC/AT, EOMI, conjunctiva normal, hearing grossly intact, MMM, Mallampati 3  Cardiovascular: Regular rate and rhythm. No murmurs     Lungs:   Clear to auscultation bilaterally with good respiratory effort. No wheezes or crackles.  Abdomen: Not distended, soft  Extremities:  No edema or obvious deformities.  Skin:  Warm and dry.  No visible rashes.  Neurologic: Alert & oriented x 3, no focal deficits noted, gait normal  Psychiatric:  Affect normal, mood normal, judgment normal.      Assessment and Plan:     Problem List Items Addressed This Visit       Other fatigue     Chronic fatigue x1 year  Fatigue may be related to MG itself or a component of sleep apnea (muscle weakness, central sleep apnea?)  Labs obtained by neurology negative for secondary causes of fatigue  PHQ-9 today: 0  STOPBAN, Mallampati 3  -Advised sleep hygiene, hydration, diet and exercise  -Referral to sleep medicine sent, appreciate assistance and recommendations           Relevant Orders    Referral to Pulmonary and Sleep Medicine       Return in about 6 months (around 2023).    Jory Ramirez M.D. PGY-2  [facility]

## 2023-05-31 NOTE — PATIENT INSTRUCTIONS
Thank you for coming in today, Wang  I have sent a referral to sleep medicine, please wait for them to contact you. If you do not hear from them in around 2 weeks please let me know  Follow up in around 6 months or sooner if needed for annual wellness visit

## 2023-05-31 NOTE — ASSESSMENT & PLAN NOTE
Chronic fatigue x1 year  Fatigue may be related to MG itself or a component of sleep apnea (muscle weakness, central sleep apnea?)  Labs obtained by neurology negative for secondary causes of fatigue  PHQ-9 today: 0  STOPBAN, Mallampati 3  -Advised sleep hygiene, hydration, diet and exercise  -Referral to sleep medicine sent, appreciate assistance and recommendations

## 2023-08-08 ENCOUNTER — OFFICE VISIT (OUTPATIENT)
Dept: INTERNAL MEDICINE | Facility: OTHER | Age: 63
End: 2023-08-08
Payer: COMMERCIAL

## 2023-08-08 VITALS
SYSTOLIC BLOOD PRESSURE: 119 MMHG | BODY MASS INDEX: 23.8 KG/M2 | DIASTOLIC BLOOD PRESSURE: 68 MMHG | WEIGHT: 139.4 LBS | HEIGHT: 64 IN | HEART RATE: 60 BPM | TEMPERATURE: 98.4 F | OXYGEN SATURATION: 94 %

## 2023-08-08 DIAGNOSIS — H91.91 DECREASED HEARING OF RIGHT EAR: ICD-10-CM

## 2023-08-08 DIAGNOSIS — G70.00 MYASTHENIA GRAVIS (HCC): ICD-10-CM

## 2023-08-08 DIAGNOSIS — H61.21 IMPACTED CERUMEN OF RIGHT EAR: ICD-10-CM

## 2023-08-08 DIAGNOSIS — Z23 NEED FOR TDAP VACCINATION: ICD-10-CM

## 2023-08-08 PROCEDURE — 90715 TDAP VACCINE 7 YRS/> IM: CPT

## 2023-08-08 PROCEDURE — 69210 REMOVE IMPACTED EAR WAX UNI: CPT | Mod: GC

## 2023-08-08 PROCEDURE — 90471 IMMUNIZATION ADMIN: CPT

## 2023-08-08 PROCEDURE — 99214 OFFICE O/P EST MOD 30 MIN: CPT | Mod: 25,GC

## 2023-08-08 PROCEDURE — 3078F DIAST BP <80 MM HG: CPT | Mod: GC

## 2023-08-08 PROCEDURE — 3074F SYST BP LT 130 MM HG: CPT | Mod: GC

## 2023-08-08 RX ORDER — ALENDRONATE SODIUM 70 MG/1
70 TABLET ORAL
COMMUNITY
End: 2023-08-08

## 2023-08-08 NOTE — PATIENT INSTRUCTIONS
-Avoid using Q-tips to clean your ears. Use a washcloth to clean the outside.   -Referral placed to neuro-ophthalmology. They should contact you in 1-2 weeks. If you do not hear from them, check MyChart. I did place message to call you when referral processed.   -Follow-up with Dr. Ramirez for health maintenance and chronic medical issues.      ---Call this number to schedule appointment with sleep medicine  [address]  [facility] [address]  Phone: [phone]

## 2023-08-08 NOTE — PROGRESS NOTES
Established Patient    Patient Care Team:  Jory Ramirez M.D. as PCP - General (Internal Medicine)    Wang Ivy is a 63 y.o. male who presents today with the following Chief Complaint(s): Follow up for Diagnoses of Decreased hearing of right ear, Impacted cerumen of right ear, MG (myasthenia gravis) ([facility]), and Need for Tdap vaccination were pertinent to this visit.    HPI:  Wang Ivy is a 63 year-old male with past medical history of myasthenia gravis, subclinical hypothyroidism, drug-induced osteoporosis, IFG, GERD, DLD and chronic back pain who presents to the clinic today for decreased hearing in his right ear.    Symptoms started 4 days ago after he got out of the shower. Able to hear in right ear, but not as well as left ear. Denies any trauma to the ear. No fevers, chills, lightheadedness, dizziness, vertigo, tinnitus, rhinorrhea, nasal congestion, sore throat, ear drainage or pain. Not on any new medications.  No long term exposure to heavy machinery. Cleans his ears frequently with Q-tips.     Requesting referral for neuro-ophthalmology for grade I ocular myasthenia gravis. Was previously followed by Dr. Taylor and was lost to follow-up. Patient does experience double vision. Currently on Mestinon 60 mg TID and Imuran 50 mg BID.     ROS:     General: No fevers, chills, night sweats, weight loss or gain  HEENT: Hearing changes. No vision changes, eye pain, ear pain, nasal discharge, sore throat  Neck: No swelling in neck  Pulmonary: No shortness of breath, cough, sputum, or hemoptysis  Cardiovascular: No chest pain, palpitations, or LE swelling  GI: No nausea, vomiting, diarrhea, constipation, abdominal pain, hematochezia or melena  : No dysuria or frequency  Neuro: No focal weakness, no general weakness, no headaches, no lightheadedness, no dizziness  Psych: No anxiety or depression    Past Medical History:   Diagnosis Date    Adrenal insufficiency (HCC) 6/13/2016     "Cataract     r, early stage    Compression fracture of cervical spine ([facility]) 6/13/2016    Flu     03-    Myasthenia gravis (formerly [facility]) 2013    Pain 03-    back, 4/10    Subclinical hypothyroidism 6/13/2016     Social History     Tobacco Use    Smoking status: Never    Smokeless tobacco: Never   Vaping Use    Vaping Use: Never used   Substance Use Topics    Alcohol use: No     Alcohol/week: 0.0 oz    Drug use: No     Current Outpatient Medications   Medication Sig Dispense Refill    diclofenac sodium (VOLTAREN) 1 % Gel Apply 1 g topically 3 times a day as needed (pain). 350 g 6    azaTHIOprine (IMURAN) 50 MG Tab Take 100 mg by mouth 2 times a day.      ezetimibe (ZETIA) 10 MG Tab Take 1 Tablet by mouth every day. 90 Tablet 3    famotidine (PEPCID) 20 MG Tab Take 20 mg by mouth 1 time a day as needed.      pyridostigmine (MESTINON) 60 MG Tab Take 60 mg by mouth 3 times a day. Indications: Myasthenia Gravis      Flaxseed, Linseed, (FLAXSEED OIL PO) Take 1 Tab by mouth every day.      B Complex Vitamins (B COMPLEX 1 PO) Take 1 Tab by mouth every day.      Cholecalciferol (VITAMIN D) 2000 UNITS Cap Take 1 Cap by mouth every day.      CALCIUM PO Take 1 Tab by mouth every day.       No current facility-administered medications for this visit.       Physical Exam:  /68 (BP Location: Left arm, Patient Position: Sitting, BP Cuff Size: Small adult)   Pulse 60   Temp 36.9 °C (98.4 °F) (Temporal)   Ht 1.626 m (5' 4\")   Wt 63.2 kg (139 lb 6.4 oz)   SpO2 94%   BMI 23.93 kg/m²   General: Well developed, well nourished male, in no distress.  HEENT: NC/AT, PERRL, EOMI, no scleral icterus or conjunctival pallor, unable to visual right TM due to impacted cerumen in middle ear canal.   Neck: Supple, No cervical or supraclavicular LAD  CV:RRR, no murmurs gallops or Rubs  Pulm: LCAB, no crackles, rales, rhonchi, or wheezing  GI: Normal bowel sounds, abdomen soft, nontender, nondistended to deep or light palpation in all " 4 quadrants, no HSM.  MSK: Radial pulses 2+ and equal bilaterally.  Strength 5 out of 5 in upper and lower extremities.  No lower extremity edema  Neuro: Patient is alert and oriented x3, no focal deficits  Psych: Appropriate mood and affect       Assessment and Plan:   1. Decreased hearing of right ear  Due to impacted cerumen in the right ear. Hearing issue resolved after cerumen was removed.   Educated patient on avoiding using a Q-tip to clean ears as this pushes earwax further into ear canal. Can use OTC ear drops or oil to remove earwax. Handout provided.    2. Impacted cerumen of right ear  On physical exam, unable to visualize right TM due to impacted cerumen  - Ear Cerumen Removal    3. MG (myasthenia gravis) ([facility])  Grade 1 ocular myasthenia gravis  On mestinon 60 mg TID and Imuran 50 mg BID.   Patient requesting referral to neuro ophthalmology   - Referral to Neuro Ophthalmology    4. Need for Tdap vaccination  - Tdap =>6yo IM    Return in about 6 weeks (around 9/19/2023).    Patient Instructions   -Avoid using Q-tips to clean your ears. Use a washcloth to clean the outside.   -Referral placed to neuro-ophthalmology. They should contact you in 1-2 weeks. If you do not hear from them, check MyChart. I did place message to call you when referral processed.   -Follow-up with Dr. Ramirez for health maintenance and chronic medical issues.      ---Call this number to schedule appointment with sleep medicine  [address]  [facility] [address]  Phone: [phone]     Melissa Coelho M.D. PGY-3  [facility]    This note was created using voice recognition software.  While every attempt is made to ensure accuracy of transcription, occasionally errors occur.

## 2023-09-29 ENCOUNTER — OFFICE VISIT (OUTPATIENT)
Dept: INTERNAL MEDICINE | Facility: OTHER | Age: 63
End: 2023-09-29
Payer: COMMERCIAL

## 2023-09-29 VITALS
DIASTOLIC BLOOD PRESSURE: 85 MMHG | SYSTOLIC BLOOD PRESSURE: 131 MMHG | HEART RATE: 63 BPM | WEIGHT: 137 LBS | OXYGEN SATURATION: 95 % | BODY MASS INDEX: 23.52 KG/M2 | TEMPERATURE: 97 F

## 2023-09-29 DIAGNOSIS — R53.83 OTHER FATIGUE: ICD-10-CM

## 2023-09-29 PROCEDURE — 99213 OFFICE O/P EST LOW 20 MIN: CPT | Mod: GE | Performed by: STUDENT IN AN ORGANIZED HEALTH CARE EDUCATION/TRAINING PROGRAM

## 2023-09-29 PROCEDURE — 3079F DIAST BP 80-89 MM HG: CPT | Performed by: STUDENT IN AN ORGANIZED HEALTH CARE EDUCATION/TRAINING PROGRAM

## 2023-09-29 PROCEDURE — 3075F SYST BP GE 130 - 139MM HG: CPT | Performed by: STUDENT IN AN ORGANIZED HEALTH CARE EDUCATION/TRAINING PROGRAM

## 2023-09-29 RX ORDER — PREDNISONE 1 MG/1
TABLET ORAL
COMMUNITY
Start: 2023-09-25

## 2023-09-29 NOTE — ASSESSMENT & PLAN NOTE
Chronic fatigue x1 year  Fatigue may be related to MG itself or a component of sleep apnea (muscle weakness, central sleep apnea?)  Previous labs ordered by neurology were negative for secondary causes of fatigue  STOPBAN, Mallampati 3  -Neurology is considering subclinical hypothyroidism in patient, working patient up for Hashimoto's  -Advised sleep hygiene, hydration, diet and exercise  -Referral to sleep medicine sent, previously. Patient provided with information to schedule appt

## 2023-09-29 NOTE — PROGRESS NOTES
Established Patient    Patient Care Team:  Jory Ramirez M.D. as PCP - General (Internal Medicine)    HPI:  Wang Ivy is a 63 y.o. male with PMH of myasthenia gravis (on Imuran and Mestinon, follows with neurology), DLD, arthritis of hip, chronic bilateral low back pain without sciatica, and chronic cough who presents today with the following Chief Complaint(s):   Chief Complaint   Patient presents with    Follow-Up     Patient comes in today for follow up, without acute complaints. Was updated on being started on prednisone 1mg MWF per neurology, Dr. Obando for concerns of ocular MG given patient reported visual changes. Neuro-ophthalmology referral was placed at previous visit.  Other than that, patient was told that he may have Hashimoto's per neurology and will be getting the following labs done: CBC, CMP, Vit B12, thyroglobulin ab, TPO microsomal ab, TSH,fT4, fT3. When discussed further, patient reports ongoing fatigue unchanged from previous visit but denies hair/skin/nail changes, weight gain, constipation,  cold intolerance, paresthesias, mood changes.    Review of Systems   Constitutional: Denies chills, fever, +fatigue  HEENT: +occasional double vision, denies blurry vision, congestion, sore throat  Respiratory: Denies shortness of breath, cough, wheezing   Cardiovascular: Denies chest pain, palpitations, leg swelling  Gastrointestinal: Denies nausea, vomiting, abdominal pain, constipation  Genitourinary: Denies dysuria, frequency  Musculoskeletal: Denies falls and myalgias  Neurological: Negative for headaches, dizziness      Past Medical History:   Diagnosis Date    Adrenal insufficiency (HCC) 6/13/2016    Cataract     r, early stage    Compression fracture of cervical spine (HCC) 6/13/2016    Flu     03-    Myasthenia gravis ([facility]) 2013    Pain 03-    back, 4/10    Subclinical hypothyroidism 6/13/2016       Patient Active Problem List    Diagnosis Date Noted     Other fatigue 05/31/2023    Congestion of nasal sinus 03/07/2023    Nocturia 12/13/2022    Muscle strain of left upper arm 12/13/2022    Gastroesophageal reflux disease without esophagitis 03/04/2022    Arthritis, hip 07/09/2021    Stearns's neuroma of right foot 05/23/2019    Drug-induced osteoporosis 12/20/2016    Impaired fasting blood sugar 10/21/2016    Dyslipidemia 10/21/2016    Subclinical hypothyroidism 06/13/2016    Compression fracture of lumbar spine, non-traumatic, with routine healing, subsequent encounter 06/13/2016    Pathological fracture due to osteoporosis 05/25/2016    MG (myasthenia gravis) ([facility]) 03/10/2016    Chronic back pain 03/10/2016       Allergies:Cillins [penicillins] and Merrem [meropenem]    Current Outpatient Medications   Medication Sig Dispense Refill    diclofenac sodium (VOLTAREN) 1 % Gel Apply 1 g topically 3 times a day as needed (pain). 350 g 6    azaTHIOprine (IMURAN) 50 MG Tab Take 100 mg by mouth 2 times a day.      ezetimibe (ZETIA) 10 MG Tab Take 1 Tablet by mouth every day. 90 Tablet 3    famotidine (PEPCID) 20 MG Tab Take 20 mg by mouth 1 time a day as needed.      pyridostigmine (MESTINON) 60 MG Tab Take 60 mg by mouth 3 times a day. Indications: Myasthenia Gravis      Flaxseed, Linseed, (FLAXSEED OIL PO) Take 1 Tab by mouth every day.      B Complex Vitamins (B COMPLEX 1 PO) Take 1 Tab by mouth every day.      Cholecalciferol (VITAMIN D) 2000 UNITS Cap Take 1 Cap by mouth every day.      CALCIUM PO Take 1 Tab by mouth every day.       No current facility-administered medications for this visit.       Social History     Tobacco Use    Smoking status: Never    Smokeless tobacco: Never   Vaping Use    Vaping Use: Never used   Substance Use Topics    Alcohol use: No     Alcohol/week: 0.0 oz    Drug use: No       Family History   Problem Relation Age of Onset    Heart Attack Mother     Heart Attack Father          Physical Exam  Vitals:  /85 (BP Location: Right arm,  Patient Position: Sitting, BP Cuff Size: Large adult)   Pulse 63   Temp 36.1 °C (97 °F) (Temporal)   Wt 62.1 kg (137 lb)   SpO2 95%  Body mass index is 23.52 kg/m².  Constitutional:  Not in acute distress, well appearing.  HEENT:   NC/AT, EOMI, conjunctiva normal, hearing grossly intact  Cardiovascular: Regular rate and rhythm. No murmurs or gallops.      Lungs:   Clear to auscultation bilaterally. No wheezes or crackles.  Abdomen: Not distended, soft, not tender.  Extremities:  No edema or obvious deformities.  Skin:  Warm and dry.  No visible rashes.  Neurologic: Alert & oriented x 3, CN II-XII grossly intact, no focal deficits noted.  Psychiatric:  Affect normal, mood normal, judgment normal.      Assessment and Plan:     Problem List Items Addressed This Visit       Other fatigue     Chronic fatigue x1 year  Fatigue may be related to MG itself or a component of sleep apnea (muscle weakness, central sleep apnea?)  Previous labs ordered by neurology were negative for secondary causes of fatigue  STOPBAN, Mallampati 3  -Neurology is considering subclinical hypothyroidism in patient, working patient up for Hashimoto's  -Advised sleep hygiene, hydration, diet and exercise  -Referral to sleep medicine sent, previously. Patient provided with information to schedule appt            Return in about 3 months (around 2023) for Annual wellness visit.      Jory Ramirez M.D. PGY-3  [facility]

## 2023-09-29 NOTE — PATIENT INSTRUCTIONS
Thank you for coming in today, Wang!    Here is the information for your colonoscopy:  [facility]   [address] [place] [address]  Phone: [phone]  Fax: [phone]    Here is the information for sleep medicine:  [facility]  [address]  [place] [address]  Phone: [phone]    Here is the information for neuro-ophthalmology:  [facility]  [address], [address]  [place] [address]  Phone: [phone]    Please come back in around 3 months for annual wellness visit

## 2023-12-13 RX ORDER — EZETIMIBE 10 MG/1
10 TABLET ORAL DAILY
Qty: 90 TABLET | Refills: 1 | Status: SHIPPED | OUTPATIENT
Start: 2023-12-13 | End: 2023-12-18 | Stop reason: SDUPTHER

## 2023-12-15 ENCOUNTER — TELEPHONE (OUTPATIENT)
Dept: INTERNAL MEDICINE | Facility: OTHER | Age: 63
End: 2023-12-15
Payer: COMMERCIAL

## 2023-12-16 NOTE — TELEPHONE ENCOUNTER
MEDICATION PRIOR AUTHORIZATION NEEDED:    1. Name of Medication: Zetia    2. Requested By (Name of Pharmacy): covermymeds      3. Is insurance on file current? [employer]    4. What is the name & phone number of the 3rd party payor? [email or web address] OF PAR STATUS:    1. Name of Medication & Dose: ***     2. Name of Prescription Coverage Company & phone #: ***    3. Date Prior Auth Submitted: ***    4. What information was given to obtain insurance decision? ***    5. Prior Auth Letter Approved or Denied? ***    6. Action Taken: Pharmacy/Patient Notified: {YES (DEF)/NO:27205}

## 2023-12-16 NOTE — TELEPHONE ENCOUNTER
DOCUMENTATION OF PAR STATUS:    1. Name of Medication & Dose: zetia 10 mg     2. Name of Prescription Coverage Company & phone #: covermymeds    3. Date Prior Auth Submitted: 12/15/2023    4. What information was given to obtain insurance decision? Notes,labs    5. Prior Auth Letter Approved or Denied? pending    6. Action Taken: Pharmacy/Patient Notified: Yes

## 2023-12-18 RX ORDER — EZETIMIBE 10 MG/1
10 TABLET ORAL DAILY
Qty: 90 TABLET | Refills: 1 | Status: SHIPPED | OUTPATIENT
Start: 2023-12-18

## 2023-12-29 ENCOUNTER — APPOINTMENT (OUTPATIENT)
Dept: INTERNAL MEDICINE | Facility: OTHER | Age: 63
End: 2023-12-29
Payer: COMMERCIAL

## 2024-01-29 ENCOUNTER — APPOINTMENT (OUTPATIENT)
Dept: INTERNAL MEDICINE | Facility: OTHER | Age: 64
End: 2024-01-29
Payer: COMMERCIAL

## 2024-05-08 ENCOUNTER — TELEPHONE (OUTPATIENT)
Dept: INTERNAL MEDICINE | Facility: OTHER | Age: 64
End: 2024-05-08
Payer: COMMERCIAL

## 2024-05-08 DIAGNOSIS — M16.10 ARTHRITIS, HIP: ICD-10-CM

## 2024-05-08 DIAGNOSIS — G89.29 CHRONIC BILATERAL LOW BACK PAIN WITHOUT SCIATICA: ICD-10-CM

## 2024-05-08 DIAGNOSIS — M54.50 CHRONIC BILATERAL LOW BACK PAIN WITHOUT SCIATICA: ICD-10-CM

## 2024-05-08 NOTE — TELEPHONE ENCOUNTER
Patient is requesting his pcp put in a prescription for Voltaren. I let patient know that I am informing his pcp and moving it forward. Thank you.

## 2024-06-17 ENCOUNTER — TELEPHONE (OUTPATIENT)
Dept: INTERNAL MEDICINE | Facility: OTHER | Age: 64
End: 2024-06-17
Payer: COMMERCIAL

## 2024-06-17 RX ORDER — EZETIMIBE 10 MG/1
10 TABLET ORAL DAILY
Qty: 90 TABLET | Refills: 0 | Status: SHIPPED | OUTPATIENT
Start: 2024-06-17

## 2024-06-17 NOTE — TELEPHONE ENCOUNTER
Phone Number Called: [phone] (home)     Call outcome: Spoke to patient regarding message below.    Message: Informed pt that medication was filled. Pt needed to establish with provider in order for future refills. Re-estab appointment on 7/12/24. Closing enc

## 2024-06-17 NOTE — TELEPHONE ENCOUNTER
DOCUMENTATION OF PAR STATUS:    1. Name of Medication & Dose: Ezetimibe 10mg     2. Name of Prescription Coverage Company & phone #: [employer] med[employer]    3. Date Prior Auth Submitted: 06/17/24    4. What information was given to obtain insurance decision? ICD-10, date patient began therapy    5. Prior Auth Status? Pending    6. Patient Notified: no

## 2024-06-17 NOTE — TELEPHONE ENCOUNTER
Pt does not want to be seen at this time to get prior authorization because he is okay with getting medication without ins and would like a refill for medication. Clarified with him that we did not get request for a refill, only for prior auth    Received request via: Patient    Was the patient seen in the last year in this department? Yes    Does the patient have an active prescription (recently filled or refills available) for medication(s) requested? No    Pharmacy Name: [facility]    Does the patient have alf Plus and need 100 day supply (blood pressure, diabetes and cholesterol meds only)? Patient does not have SCP

## 2024-06-17 NOTE — TELEPHONE ENCOUNTER
Patient states because of his medical condition he can't take the Statins due to his pain.  It causes more pain.  He is asking still if it can be prescribed.

## 2024-06-17 NOTE — TELEPHONE ENCOUNTER
FINAL PRIOR AUTHORIZATION STATUS:    1.  Name of Medication & Dose: Ezetimibe     2. Prior Auth Status: Denied.  Reason: Must try and fail atorvastatin calcoim, fluvastatin sodium, lovastatin, rosuvastatin and more. All alternatives listed in denial letter. Please prescribe alt if appropriate.    3. Action Taken: Pharmacy Notified: N\A Patient Notified: N\A

## 2024-07-19 DIAGNOSIS — M54.50 CHRONIC BILATERAL LOW BACK PAIN WITHOUT SCIATICA: ICD-10-CM

## 2024-07-19 DIAGNOSIS — G89.29 CHRONIC BILATERAL LOW BACK PAIN WITHOUT SCIATICA: ICD-10-CM

## 2024-07-19 DIAGNOSIS — M16.10 ARTHRITIS, HIP: ICD-10-CM

## 2024-08-19 ENCOUNTER — APPOINTMENT (OUTPATIENT)
Dept: INTERNAL MEDICINE | Facility: OTHER | Age: 64
End: 2024-08-19
Payer: MEDICARE

## 2024-09-04 ENCOUNTER — PATIENT MESSAGE (OUTPATIENT)
Dept: HEALTH INFORMATION MANAGEMENT | Facility: OTHER | Age: 64
End: 2024-09-04

## 2024-09-04 ENCOUNTER — APPOINTMENT (OUTPATIENT)
Dept: INTERNAL MEDICINE | Facility: OTHER | Age: 64
End: 2024-09-04
Payer: MEDICARE

## 2024-09-04 VITALS
TEMPERATURE: 98.5 F | OXYGEN SATURATION: 95 % | HEART RATE: 73 BPM | SYSTOLIC BLOOD PRESSURE: 119 MMHG | BODY MASS INDEX: 23.46 KG/M2 | HEIGHT: 64 IN | WEIGHT: 137.4 LBS | DIASTOLIC BLOOD PRESSURE: 59 MMHG

## 2024-09-04 DIAGNOSIS — G89.29 CHRONIC BILATERAL LOW BACK PAIN WITHOUT SCIATICA: ICD-10-CM

## 2024-09-04 DIAGNOSIS — E78.5 DYSLIPIDEMIA: ICD-10-CM

## 2024-09-04 DIAGNOSIS — M81.8 DRUG-INDUCED OSTEOPOROSIS: ICD-10-CM

## 2024-09-04 DIAGNOSIS — Z91.89 AT RISK FOR SLEEP APNEA: ICD-10-CM

## 2024-09-04 DIAGNOSIS — L98.9 SKIN LESION: ICD-10-CM

## 2024-09-04 DIAGNOSIS — T50.905A DRUG-INDUCED OSTEOPOROSIS: ICD-10-CM

## 2024-09-04 DIAGNOSIS — G70.00 MYASTHENIA GRAVIS (HCC): ICD-10-CM

## 2024-09-04 DIAGNOSIS — M54.50 CHRONIC BILATERAL LOW BACK PAIN WITHOUT SCIATICA: ICD-10-CM

## 2024-09-04 DIAGNOSIS — K21.9 GASTROESOPHAGEAL REFLUX DISEASE WITHOUT ESOPHAGITIS: ICD-10-CM

## 2024-09-04 PROCEDURE — 99214 OFFICE O/P EST MOD 30 MIN: CPT | Performed by: INTERNAL MEDICINE

## 2024-09-04 PROCEDURE — 3078F DIAST BP <80 MM HG: CPT | Performed by: INTERNAL MEDICINE

## 2024-09-04 PROCEDURE — 3074F SYST BP LT 130 MM HG: CPT | Performed by: INTERNAL MEDICINE

## 2024-09-04 RX ORDER — EZETIMIBE 10 MG/1
10 TABLET ORAL DAILY
Qty: 90 TABLET | Refills: 2 | Status: SHIPPED | OUTPATIENT
Start: 2024-09-04

## 2024-09-04 NOTE — PROGRESS NOTES
9/4/2024  Chief Complaint   Patient presents with    [facility]       HISTORY OF PRESENT ILLNESS: Patient is a 64 y.o. male established patient who presents today to re-establish care.   Patient has PMH significant for myasthenia gravis, hypothyroidism, GERD, HLD, chronic lower back pain and osteoporosis.    MG (myasthenia gravis) ([facility])  Diagnosed  about 10 years, when patient had ptosis and double vision. He was previously seen by Dr. Taylor and now seeing Dr. Obando at [facility]. He has been azathioprine and pyridostigmine. Patient continue to complained of double vision and was started on prednisone last years, he is taking Prednisone 1mg Thursday, Friday, Sat. He was referred to Neuro-ophthalmology, but reports that he was never called or informed.   He continues to have double vision and would like a new referral.   He was last seen at Neurology 1.5 months ago and had labs completed    Gastroesophageal reflux disease without esophagitis  Patient reports history of occasional cough that was though to be related to MG and GERD. He was started on Pepcid daily, and reports improvement in symptoms. Denies any abdominal pain, N/V, abnormal weight loss.     Dyslipidemia  Per history, patient states he has never been on statin therapy, as it was believe that it would worsening his muscle pain. He was recommend by his prior PCP 2-3 years ago to stay on Ezetimibe. He reports that he has been tolerating medication without side effects. Had a CT calcium score of 1.1 (2018)  Denies any chest pain, SHOB/TAPIA, palpitations.     Drug-induced osteoporosis  Secondary high dose steroid use. Had compression fracture of L2-L4. He was on bisphosphate therapy for about 5 years. Repeat DEXA 2021 with osteopenic for the distal right forearm and normal for the proximal right femur.   He continues to take Vitamin D and calcium supplementation.    Skin lesion  Patient reports recurrent dry spot on his scalp for the past few years  that comes and goes. He has seen Dermatology in  in the past with cryotherapy, at some point was told that he need to have an biopsy done to see if it is cancerous/  Reports brother with history of skin cancer    History of polyps  Reports he has colonscopy last year with 3 polyps removed.   No family history of colon cancer      Past Medical History:   Diagnosis Date    Adrenal insufficiency ([facility]) 6/13/2016    Cataract     r, early stage    Compression fracture of cervical spine ([facility]) 6/13/2016    Flu     03-    Myasthenia gravis ([facility]) 2013    Pain 03-    back, 4/10    Subclinical hypothyroidism 6/13/2016       Patient Active Problem List    Diagnosis Date Noted    Other fatigue 05/31/2023    Congestion of nasal sinus 03/07/2023    Nocturia 12/13/2022    Muscle strain of left upper arm 12/13/2022    Gastroesophageal reflux disease without esophagitis 03/04/2022    Arthritis, hip 07/09/2021    Stearns's neuroma of right foot 05/23/2019    Drug-induced osteoporosis 12/20/2016    Impaired fasting blood sugar 10/21/2016    Dyslipidemia 10/21/2016    Subclinical hypothyroidism 06/13/2016    Compression fracture of lumbar spine, non-traumatic, with routine healing, subsequent encounter 06/13/2016    Pathological fracture due to osteoporosis 05/25/2016    MG (myasthenia gravis) ([facility]) 03/10/2016    Chronic back pain 03/10/2016       Allergies:Cillins [penicillins] and Merrem [meropenem]    Current Outpatient Medications   Medication Sig Dispense Refill    diclofenac sodium (VOLTAREN) 1 % Gel Apply 1 g topically 3 times a day as needed (pain). 350 g 6    ezetimibe (ZETIA) 10 MG Tab Take 1 Tablet by mouth every day. 90 Tablet 2    predniSONE (DELTASONE) 1 MG Tab Take 1 mg by mouth in the morning every Mon, Wed, Fri, and Sun. 1 mg tablet every Thurs, Fri, Sat  Indications: Myasthenia Gravis      azaTHIOprine (IMURAN) 50 MG Tab Take 100 mg by mouth 2 times a day.      famotidine (PEPCID) 20 MG Tab Take 20 mg by  "mouth 1 time a day as needed.      pyridostigmine (MESTINON) 60 MG Tab Take 60 mg by mouth 3 times a day. Indications: Myasthenia Gravis      Flaxseed, Linseed, (FLAXSEED OIL PO) Take 1 Tab by mouth every day.      B Complex Vitamins (B COMPLEX 1 PO) Take 1 Tab by mouth every day.      Cholecalciferol (VITAMIN D) 2000 UNITS Cap Take 1 Cap by mouth every day.      CALCIUM PO Take 1 Tab by mouth every day.       No current facility-administered medications for this visit.       Social History     Tobacco Use    Smoking status: Never    Smokeless tobacco: Never   Vaping Use    Vaping status: Never Used   Substance Use Topics    Alcohol use: No     Alcohol/week: 0.0 oz    Drug use: No       Family History   Problem Relation Age of Onset    Heart Attack Mother     Heart Attack Father          Review of Systems:   Review of Systems   Constitutional:  Positive for malaise/fatigue. Negative for chills, fever and weight loss.   HENT:  Negative for congestion, hearing loss and sore throat.    Eyes:  Positive for double vision. Negative for blurred vision, pain, discharge and redness.   Respiratory:  Positive for cough (occasional). Negative for sputum production, shortness of breath and wheezing.    Cardiovascular:  Negative for chest pain, palpitations and leg swelling.   Gastrointestinal:  Negative for abdominal pain, blood in stool, constipation, diarrhea, heartburn, melena, nausea and vomiting.   Musculoskeletal:  Positive for back pain and myalgias. Negative for falls.   Neurological:  Negative for dizziness, weakness and headaches.   Psychiatric/Behavioral:  Negative for depression. The patient is not nervous/anxious.        Exam:  /59 (BP Location: Left arm, Patient Position: Sitting, BP Cuff Size: Adult)   Pulse 73   Temp 36.9 °C (98.5 °F) (Temporal)   Ht 1.626 m (5' 4\")   Wt 62.3 kg (137 lb 6.4 oz)   SpO2 95%  Body mass index is 23.58 kg/m².  Physical Exam  Constitutional:       General: He is not in acute " distress.     Appearance: He is normal weight.   HENT:      Head: Normocephalic and atraumatic.      Right Ear: Tympanic membrane, ear canal and external ear normal.      Left Ear: Tympanic membrane, ear canal and external ear normal.      Nose: Nose normal.      Mouth/Throat:      Mouth: Mucous membranes are moist.      Pharynx: Oropharynx is clear.   Eyes:      Extraocular Movements: Extraocular movements intact.      Conjunctiva/sclera: Conjunctivae normal.   Cardiovascular:      Rate and Rhythm: Normal rate and regular rhythm.      Heart sounds: No murmur heard.  Pulmonary:      Effort: Pulmonary effort is normal.      Breath sounds: Normal breath sounds.   Abdominal:      General: There is no distension.      Palpations: Abdomen is soft.      Tenderness: There is no abdominal tenderness.   Musculoskeletal:      Cervical back: Neck supple.      Right lower leg: No edema.      Left lower leg: No edema.   Lymphadenopathy:      Cervical: No cervical adenopathy.   Skin:     General: Skin is warm and dry.   Neurological:      General: No focal deficit present.      Mental Status: He is alert.   Psychiatric:         Mood and Affect: Mood normal.         Behavior: Behavior normal.         Thought Content: Thought content normal.           Assessment/Plan:   MG (myasthenia gravis) (HCC)  Noticed in 2014 was previously seen by Dr. Taylor. Now being followed by Dr. Obando at [facility].   Per patient was seen about 1.5 months ago  He remains on Azathioprine, Pyridostigmine and Prednisone.  He continues to have double vision, suspect ocular MG. He was referred to Neuro-Opthalmo but never was seen  He is requesting a new referral, referral placed  KIMBERLY for [facility]  - Referral to Neuro Ophthalmology    Gastroesophageal reflux disease without esophagitis  Symptoms are stable, doing well on Pepcide    Dyslipidemia  With history of dyslipidemia, CT calcium score of 1.1 (2018)  His prior PCP recommended avoid statin  therapy due increase risk of myalgia  Tolerating Zetia well without any side effects  Last lipid panel on file 2023.  Discussed repeat labs, however, patient declined. Reports he [facility] and will bring his most recent lab work  - ezetimibe (ZETIA) 10 MG Tab; Take 1 Tablet by mouth every day.  Dispense: 90 Tablet; Refill: 2    Drug-induced osteoporosis  Secondary to high dose steroids.  Patient was tapered off high-dose steroids a few years ago and completed 5 years of bisphosphonate therapy.  DEXA scan  with osteopenia of the right forearm, normal bone density of right femur  Repeat DEXA scan  Vitamin D3 and calcium supplement.  Recommend weightbearing exercises and resistance training.  - DS-BONE DENSITY STUDY (DEXA); Future    Skin lesion  Scalp lesion that comes and goes, has had cryotherapy.  Was seen by dermatology in [place] with recommendations of biopsy  - Referral to Dermatology    Chronic bilateral low back pain without sciatica  - diclofenac sodium (VOLTAREN) 1 % Gel; Apply 1 g topically 3 times a day as needed (pain).  Dispense: 350 g; Refill: 6    At risk for sleep apnea  Patient c/o fatigue, which could likely be related to MG.  STOPBAN  - Referral to Pulmonary and Sleep Medicine    All imaging results and lab results and consult notes are reviewed at this visit.  Followup: Return in about 3 months (around 2024).

## 2024-09-04 NOTE — LETTER
[facility]  Jory Ramirez M.D.  [address] [place] [address]  Fax: [phone]   Authorization for Release/Disclosure of   Protected Health Information   Name: ESTRELLA NGUYỄN : 1960 SSN: [id number]   Address: [address] [place] [address] [address] [place] [address] Phone:    [phone] (home)    I authorize the entity listed below to release/disclose the PHI below to:   [facility]/Jory Ramirez M.D. and Cristiana Calloway D.O.   Provider or Entity Name:     Address   City, State, Zip   Phone:      Fax:     Reason for request: continuity of care   Information to be released:    [  ] LAST COLONOSCOPY,  including any PATH REPORT and follow-up  [  ] LAST FIT/COLOGUARD RESULT [  ] LAST DEXA  [  ] LAST MAMMOGRAM  [  ] LAST PAP  [  ] LAST LABS [  ] RETINA EXAM REPORT  [  ] IMMUNIZATION RECORDS  [  ] Release all info      [  ] Check here and initial the line next to each item to release ALL health information INCLUDING  _____ Care and treatment for drug and / or alcohol abuse  _____ HIV testing, infection status, or AIDS  _____ Genetic Testing    DATES OF SERVICE OR TIME PERIOD TO BE DISCLOSED: _____________  I understand and acknowledge that:  * This Authorization may be revoked at any time by you in writing, except if your health information has already been used or disclosed.  * Your health information that will be used or disclosed as a result of you signing this authorization could be re-disclosed by the recipient. If this occurs, your re-disclosed health information may no longer be protected by State or Federal laws.  * You may refuse to sign this Authorization. Your refusal will not affect your ability to obtain treatment.  * This Authorization becomes effective upon signing and will  on (date) __________.      If no date is indicated, this Authorization will  one (1) year from the signature date.    Name: Estrella Nguyễn  Signature: Date:   2024     PLEASE FAX REQUESTED  RECORDS BACK TO: [phone]

## 2024-09-05 ENCOUNTER — TELEPHONE (OUTPATIENT)
Dept: INTERNAL MEDICINE | Facility: OTHER | Age: 64
End: 2024-09-05
Payer: MEDICAID

## 2024-09-05 ASSESSMENT — ENCOUNTER SYMPTOMS
COUGH: 1
DIZZINESS: 0
BACK PAIN: 1
SPUTUM PRODUCTION: 0
PALPITATIONS: 0
CHILLS: 0
HEADACHES: 0
SORE THROAT: 0
MYALGIAS: 1
EYE DISCHARGE: 0
EYE PAIN: 0
HEARTBURN: 0
DEPRESSION: 0
NERVOUS/ANXIOUS: 0
NAUSEA: 0
DIARRHEA: 0
DOUBLE VISION: 1
EYE REDNESS: 0
BLOOD IN STOOL: 0
WHEEZING: 0
ABDOMINAL PAIN: 0
WEIGHT LOSS: 0
VOMITING: 0
FEVER: 0
CONSTIPATION: 0
SHORTNESS OF BREATH: 0
FALLS: 0
BLURRED VISION: 0
WEAKNESS: 0

## 2024-09-05 NOTE — TELEPHONE ENCOUNTER
Patient was seen yesterday and he was going to go to get labs done at a location that does not require active labs on order from the doctor.  They don't provide that service any longer so he is requesting Dr clayton to order him labs that they discussed. Patient would like a call back when labs are ready to be picked up.

## 2024-09-09 DIAGNOSIS — K21.9 GASTROESOPHAGEAL REFLUX DISEASE WITHOUT ESOPHAGITIS: ICD-10-CM

## 2024-09-09 DIAGNOSIS — R53.83 OTHER FATIGUE: ICD-10-CM

## 2024-09-09 DIAGNOSIS — G70.00 MYASTHENIA GRAVIS (HCC): ICD-10-CM

## 2024-09-09 DIAGNOSIS — T50.905A DRUG-INDUCED OSTEOPOROSIS: ICD-10-CM

## 2024-09-09 DIAGNOSIS — M81.8 DRUG-INDUCED OSTEOPOROSIS: ICD-10-CM

## 2024-09-09 DIAGNOSIS — E78.5 DYSLIPIDEMIA: ICD-10-CM

## 2024-09-09 NOTE — TELEPHONE ENCOUNTER
Cristiana Calloway D.O.  You25 minutes ago (8:31 AM)     LL  Please patient know labs have been ordered     You  Cristiana Calloway D.O.4 days ago     CS  Pt does not have current labs, please place new orders for pts if applicable and I will mail or have pt pick them up. Thank you!

## 2024-09-09 NOTE — TELEPHONE ENCOUNTER
Phone Number Called: [phone] (home)     Call outcome: Spoke to patient regarding message below.    Message: Called and spoke to patient to see how he'd like labs received. Pt states he will pick them up. Printing for patient to

## 2024-09-24 ENCOUNTER — APPOINTMENT (OUTPATIENT)
Dept: LAB | Facility: MEDICAL CENTER | Age: 64
End: 2024-09-24
Attending: INTERNAL MEDICINE
Payer: MEDICAID

## 2024-09-24 ENCOUNTER — HOSPITAL ENCOUNTER (OUTPATIENT)
Dept: LAB | Facility: MEDICAL CENTER | Age: 64
End: 2024-09-24
Attending: INTERNAL MEDICINE
Payer: MEDICARE

## 2024-09-24 DIAGNOSIS — G70.00 MYASTHENIA GRAVIS (HCC): ICD-10-CM

## 2024-09-24 DIAGNOSIS — T50.905A DRUG-INDUCED OSTEOPOROSIS: ICD-10-CM

## 2024-09-24 DIAGNOSIS — K21.9 GASTROESOPHAGEAL REFLUX DISEASE WITHOUT ESOPHAGITIS: ICD-10-CM

## 2024-09-24 DIAGNOSIS — R53.83 OTHER FATIGUE: ICD-10-CM

## 2024-09-24 DIAGNOSIS — M81.8 DRUG-INDUCED OSTEOPOROSIS: ICD-10-CM

## 2024-09-24 DIAGNOSIS — E78.5 DYSLIPIDEMIA: ICD-10-CM

## 2024-09-24 LAB
25(OH)D3 SERPL-MCNC: 27 NG/ML (ref 30–100)
ERYTHROCYTE [DISTWIDTH] IN BLOOD BY AUTOMATED COUNT: 46 FL (ref 35.9–50)
EST. AVERAGE GLUCOSE BLD GHB EST-MCNC: 120 MG/DL
HBA1C MFR BLD: 5.8 % (ref 4–5.6)
HCT VFR BLD AUTO: 47.9 % (ref 42–52)
HGB BLD-MCNC: 16.6 G/DL (ref 14–18)
MCH RBC QN AUTO: 32.7 PG (ref 27–33)
MCHC RBC AUTO-ENTMCNC: 34.7 G/DL (ref 32.3–36.5)
MCV RBC AUTO: 94.3 FL (ref 81.4–97.8)
PLATELET # BLD AUTO: 191 K/UL (ref 164–446)
PMV BLD AUTO: 10.5 FL (ref 9–12.9)
RBC # BLD AUTO: 5.08 M/UL (ref 4.7–6.1)
TSH SERPL DL<=0.005 MIU/L-ACNC: 2.89 UIU/ML (ref 0.38–5.33)
VIT B12 SERPL-MCNC: 749 PG/ML (ref 211–911)
WBC # BLD AUTO: 5.8 K/UL (ref 4.8–10.8)

## 2024-09-24 PROCEDURE — 85027 COMPLETE CBC AUTOMATED: CPT

## 2024-09-24 PROCEDURE — 36415 COLL VENOUS BLD VENIPUNCTURE: CPT

## 2024-09-24 PROCEDURE — 80053 COMPREHEN METABOLIC PANEL: CPT

## 2024-09-24 PROCEDURE — 82306 VITAMIN D 25 HYDROXY: CPT

## 2024-09-24 PROCEDURE — 83036 HEMOGLOBIN GLYCOSYLATED A1C: CPT

## 2024-09-24 PROCEDURE — 80061 LIPID PANEL: CPT

## 2024-09-24 PROCEDURE — 82607 VITAMIN B-12: CPT

## 2024-09-24 PROCEDURE — 84443 ASSAY THYROID STIM HORMONE: CPT

## 2024-09-25 LAB
ALBUMIN SERPL BCP-MCNC: 4.4 G/DL (ref 3.2–4.9)
ALBUMIN/GLOB SERPL: 1.5 G/DL
ALP SERPL-CCNC: 61 U/L (ref 30–99)
ALT SERPL-CCNC: 41 U/L (ref 2–50)
ANION GAP SERPL CALC-SCNC: 13 MMOL/L (ref 7–16)
AST SERPL-CCNC: 39 U/L (ref 12–45)
BILIRUB SERPL-MCNC: 0.8 MG/DL (ref 0.1–1.5)
BUN SERPL-MCNC: 16 MG/DL (ref 8–22)
CALCIUM ALBUM COR SERPL-MCNC: 9.2 MG/DL (ref 8.5–10.5)
CALCIUM SERPL-MCNC: 9.5 MG/DL (ref 8.5–10.5)
CHLORIDE SERPL-SCNC: 103 MMOL/L (ref 96–112)
CHOLEST SERPL-MCNC: 166 MG/DL (ref 100–199)
CO2 SERPL-SCNC: 22 MMOL/L (ref 20–33)
CREAT SERPL-MCNC: 1.02 MG/DL (ref 0.5–1.4)
FASTING STATUS PATIENT QL REPORTED: NORMAL
GFR SERPLBLD CREATININE-BSD FMLA CKD-EPI: 81 ML/MIN/1.73 M 2
GLOBULIN SER CALC-MCNC: 2.9 G/DL (ref 1.9–3.5)
GLUCOSE SERPL-MCNC: 90 MG/DL (ref 65–99)
HDLC SERPL-MCNC: 46 MG/DL
LDLC SERPL CALC-MCNC: 94 MG/DL
POTASSIUM SERPL-SCNC: 4.5 MMOL/L (ref 3.6–5.5)
PROT SERPL-MCNC: 7.3 G/DL (ref 6–8.2)
SODIUM SERPL-SCNC: 138 MMOL/L (ref 135–145)
TRIGL SERPL-MCNC: 130 MG/DL (ref 0–149)

## 2024-09-27 ENCOUNTER — HOSPITAL ENCOUNTER (OUTPATIENT)
Dept: LAB | Facility: MEDICAL CENTER | Age: 64
End: 2024-09-27
Attending: PSYCHIATRY & NEUROLOGY
Payer: MEDICAID

## 2024-09-27 LAB
BASOPHILS # BLD AUTO: 0.8 % (ref 0–1.8)
BASOPHILS # BLD: 0.06 K/UL (ref 0–0.12)
EOSINOPHIL # BLD AUTO: 0.1 K/UL (ref 0–0.51)
EOSINOPHIL NFR BLD: 1.4 % (ref 0–6.9)
ERYTHROCYTE [DISTWIDTH] IN BLOOD BY AUTOMATED COUNT: 45.4 FL (ref 35.9–50)
HCT VFR BLD AUTO: 46.7 % (ref 42–52)
HGB BLD-MCNC: 16.2 G/DL (ref 14–18)
IMM GRANULOCYTES # BLD AUTO: 0.02 K/UL (ref 0–0.11)
IMM GRANULOCYTES NFR BLD AUTO: 0.3 % (ref 0–0.9)
LYMPHOCYTES # BLD AUTO: 1.34 K/UL (ref 1–4.8)
LYMPHOCYTES NFR BLD: 18.9 % (ref 22–41)
MCH RBC QN AUTO: 32.6 PG (ref 27–33)
MCHC RBC AUTO-ENTMCNC: 34.7 G/DL (ref 32.3–36.5)
MCV RBC AUTO: 94 FL (ref 81.4–97.8)
MONOCYTES # BLD AUTO: 0.41 K/UL (ref 0–0.85)
MONOCYTES NFR BLD AUTO: 5.8 % (ref 0–13.4)
NEUTROPHILS # BLD AUTO: 5.15 K/UL (ref 1.82–7.42)
NEUTROPHILS NFR BLD: 72.8 % (ref 44–72)
NRBC # BLD AUTO: 0 K/UL
NRBC BLD-RTO: 0 /100 WBC (ref 0–0.2)
PLATELET # BLD AUTO: 214 K/UL (ref 164–446)
PMV BLD AUTO: 10 FL (ref 9–12.9)
RBC # BLD AUTO: 4.97 M/UL (ref 4.7–6.1)
WBC # BLD AUTO: 7.1 K/UL (ref 4.8–10.8)

## 2024-09-27 PROCEDURE — 84481 FREE ASSAY (FT-3): CPT

## 2024-09-27 PROCEDURE — 80053 COMPREHEN METABOLIC PANEL: CPT

## 2024-09-27 PROCEDURE — 84439 ASSAY OF FREE THYROXINE: CPT

## 2024-09-27 PROCEDURE — 36415 COLL VENOUS BLD VENIPUNCTURE: CPT

## 2024-09-27 PROCEDURE — 86376 MICROSOMAL ANTIBODY EACH: CPT

## 2024-09-27 PROCEDURE — 86800 THYROGLOBULIN ANTIBODY: CPT

## 2024-09-27 PROCEDURE — 84443 ASSAY THYROID STIM HORMONE: CPT

## 2024-09-27 PROCEDURE — 82607 VITAMIN B-12: CPT

## 2024-09-27 PROCEDURE — 85025 COMPLETE CBC W/AUTO DIFF WBC: CPT

## 2024-09-28 LAB
ALBUMIN SERPL BCP-MCNC: 4.4 G/DL (ref 3.2–4.9)
ALBUMIN/GLOB SERPL: 1.7 G/DL
ALP SERPL-CCNC: 69 U/L (ref 30–99)
ALT SERPL-CCNC: 37 U/L (ref 2–50)
ANION GAP SERPL CALC-SCNC: 13 MMOL/L (ref 7–16)
AST SERPL-CCNC: 38 U/L (ref 12–45)
BILIRUB SERPL-MCNC: 0.6 MG/DL (ref 0.1–1.5)
BUN SERPL-MCNC: 17 MG/DL (ref 8–22)
CALCIUM ALBUM COR SERPL-MCNC: 9.1 MG/DL (ref 8.5–10.5)
CALCIUM SERPL-MCNC: 9.4 MG/DL (ref 8.5–10.5)
CHLORIDE SERPL-SCNC: 110 MMOL/L (ref 96–112)
CO2 SERPL-SCNC: 20 MMOL/L (ref 20–33)
CREAT SERPL-MCNC: 1.08 MG/DL (ref 0.5–1.4)
GFR SERPLBLD CREATININE-BSD FMLA CKD-EPI: 76 ML/MIN/1.73 M 2
GLOBULIN SER CALC-MCNC: 2.6 G/DL (ref 1.9–3.5)
GLUCOSE SERPL-MCNC: 94 MG/DL (ref 65–99)
POTASSIUM SERPL-SCNC: 4.4 MMOL/L (ref 3.6–5.5)
PROT SERPL-MCNC: 7 G/DL (ref 6–8.2)
SODIUM SERPL-SCNC: 143 MMOL/L (ref 135–145)
T3FREE SERPL-MCNC: 2.73 PG/ML (ref 2–4.4)
T4 FREE SERPL-MCNC: 1.57 NG/DL (ref 0.93–1.7)
THYROPEROXIDASE AB SERPL-ACNC: 571 IU/ML (ref 0–9)
TSH SERPL-ACNC: 2.85 UIU/ML (ref 0.35–5.5)
VIT B12 SERPL-MCNC: 801 PG/ML (ref 211–911)

## 2024-09-29 LAB — THYROGLOB AB SERPL-ACNC: 7.9 IU/ML (ref 0–4)

## 2024-10-18 ENCOUNTER — TELEPHONE (OUTPATIENT)
Dept: INTERNAL MEDICINE | Facility: OTHER | Age: 64
End: 2024-10-18
Payer: MEDICAID

## 2024-10-25 ENCOUNTER — TELEPHONE (OUTPATIENT)
Dept: HEALTH INFORMATION MANAGEMENT | Facility: OTHER | Age: 64
End: 2024-10-25

## 2024-11-05 ENCOUNTER — HOSPITAL ENCOUNTER (OUTPATIENT)
Dept: RADIOLOGY | Facility: MEDICAL CENTER | Age: 64
End: 2024-11-05
Attending: INTERNAL MEDICINE
Payer: MEDICARE

## 2024-11-05 ENCOUNTER — TELEPHONE (OUTPATIENT)
Dept: INTERNAL MEDICINE | Facility: OTHER | Age: 64
End: 2024-11-05
Payer: MEDICARE

## 2024-11-05 DIAGNOSIS — T50.905A DRUG-INDUCED OSTEOPOROSIS: ICD-10-CM

## 2024-11-05 DIAGNOSIS — M81.8 DRUG-INDUCED OSTEOPOROSIS: ICD-10-CM

## 2024-11-05 PROCEDURE — 77080 DXA BONE DENSITY AXIAL: CPT

## 2024-11-05 NOTE — TELEPHONE ENCOUNTER
Phone Number Called: [phone] (home)     Call outcome: Spoke to patient regarding message below.    Message: Relayed Dr. Calloway's results and advise. Closing enc

## 2024-11-05 NOTE — TELEPHONE ENCOUNTER
----- Message from Physician Cristiana Calloway D.O. sent at 11/5/2024  1:58 PM PST -----  Please let patient know that his DEXA scan showed osteopenia.   I would recommend starting an OTC Vitamin D3 supplemental 800-1000IU daily.

## 2024-11-26 ENCOUNTER — APPOINTMENT (OUTPATIENT)
Dept: INTERNAL MEDICINE | Facility: OTHER | Age: 64
End: 2024-11-26
Payer: MEDICARE

## 2024-11-26 VITALS
DIASTOLIC BLOOD PRESSURE: 64 MMHG | SYSTOLIC BLOOD PRESSURE: 119 MMHG | TEMPERATURE: 96.6 F | HEART RATE: 61 BPM | OXYGEN SATURATION: 94 % | BODY MASS INDEX: 24.13 KG/M2 | WEIGHT: 140.6 LBS

## 2024-11-26 DIAGNOSIS — E78.5 DYSLIPIDEMIA: ICD-10-CM

## 2024-11-26 DIAGNOSIS — G70.00 MYASTHENIA GRAVIS (HCC): ICD-10-CM

## 2024-11-26 DIAGNOSIS — E06.3 HASHIMOTO'S THYROIDITIS: ICD-10-CM

## 2024-11-26 DIAGNOSIS — M85.831 OSTEOPENIA OF RIGHT FOREARM: ICD-10-CM

## 2024-11-26 DIAGNOSIS — M48.56XD COMPRESSION FRACTURE OF LUMBAR SPINE, NON-TRAUMATIC, WITH ROUTINE HEALING, SUBSEQUENT ENCOUNTER: ICD-10-CM

## 2024-11-26 DIAGNOSIS — G89.29 CHRONIC BILATERAL LOW BACK PAIN WITHOUT SCIATICA: ICD-10-CM

## 2024-11-26 DIAGNOSIS — R53.83 OTHER FATIGUE: ICD-10-CM

## 2024-11-26 DIAGNOSIS — M54.50 CHRONIC BILATERAL LOW BACK PAIN WITHOUT SCIATICA: ICD-10-CM

## 2024-11-26 PROBLEM — M85.89 OSTEOPENIA OF MULTIPLE SITES: Status: ACTIVE | Noted: 2024-11-26

## 2024-11-26 PROCEDURE — 3074F SYST BP LT 130 MM HG: CPT | Performed by: INTERNAL MEDICINE

## 2024-11-26 PROCEDURE — 3078F DIAST BP <80 MM HG: CPT | Performed by: INTERNAL MEDICINE

## 2024-11-26 PROCEDURE — 99214 OFFICE O/P EST MOD 30 MIN: CPT | Performed by: INTERNAL MEDICINE

## 2024-11-26 ASSESSMENT — FIBROSIS 4 INDEX: FIB4 SCORE: 1.9

## 2024-11-26 NOTE — PATIENT INSTRUCTIONS
[facility]  SHAHE[address] FIELDS  [address] [place] [address], [address]  [facility] [address]  Phone: [phone]

## 2024-11-27 PROBLEM — E06.3 HASHIMOTO'S THYROIDITIS: Status: ACTIVE | Noted: 2024-11-27

## 2024-11-27 ASSESSMENT — ENCOUNTER SYMPTOMS
SHORTNESS OF BREATH: 0
CHILLS: 0
FEVER: 0
MYALGIAS: 1
DOUBLE VISION: 1
BLURRED VISION: 0
NAUSEA: 0
COUGH: 0
WEAKNESS: 0
WEIGHT LOSS: 0
EYE PAIN: 0
DEPRESSION: 0
PHOTOPHOBIA: 0
VOMITING: 0
PALPITATIONS: 0
BACK PAIN: 1
HEADACHES: 0
SORE THROAT: 0
DIZZINESS: 0
NERVOUS/ANXIOUS: 0
ABDOMINAL PAIN: 0

## 2024-11-27 NOTE — PROGRESS NOTES
11/27/2024  Chief Complaint   Patient presents with    Results     Dexa scan and lab results    Medication Follow-up     Resend voltaren gel to [facility] on [address] - pharm added on file       HISTORY OF PRESENT ILLNESS: Patient is a 64 y.o. male established patient who presents today for follow-up on DEXA and lab results.     Drug-induced osteoporosis  Secondary high dose steroid use. Had compression fracture of L2-L4. He was on bisphosphate therapy for about 5 years. Repeat DEXA 2021 with osteopenic for the distal right forearm and normal for the proximal right femur.   Recent DEXA scan with osteopenia of the distal right forearm and normal for the proximal right femur.   He continues to take Vitamin D and calcium supplementation.    Dyslipidemia  Has been on Zetia for the past 2 to 3 years moderating medications without any side effects.  Restarted on any statin therapy as it was believed that it could worsen his muscle pain.  In 2018, CT calcium score 1.1.  Lipid panel-total cholesterol 166, HDL 46, LDL 94    Chronic bilateral low back pain without sciatica  Compression fracture of lumbar spine, non-traumatic, with routine healing, subsequent encounter  Long-standing history, he has participated in PT in the past which did help with lower back pain and hip weakness.  Has had prior kyphoplasty. Reports that lower back pain has been stable, occasionally has flares. Denies any chest, tingling, bladder/bowel incontinence or saddle anesthesia.  He uses Diclofenac gel as needed for pain relief. He is requesting a referral to PT.    Other fatigue  MG (myasthenia gravis) ([facility])  Continues to see Dr. Obando at [facility]. He has been azathioprine and pyridostigmine. He is also taking Prednisone 1mg Thursday, Friday, Sat and continues to complain of occasional double vision. He was referred to Neuro-ophthalmology, Dr. Gloria, but has yet to schedule an appointment. He denies any trouble swallowing, breathing or changes in  speech.   He continues to endorse some fatigue, has an appointment with Sleep Medicine in March for sleep study.  Recent labs from Neuro with elevated anti-TPO and anti-thyroglobulin with normal TSH/freeT3/T4        Past Medical History:   Diagnosis Date    Adrenal insufficiency ([facility]) 6/13/2016    Cataract     r, early stage    Compression fracture of cervical spine ([facility]) 6/13/2016    Flu     03-    Myasthenia gravis ([facility]) 2013    Pain 03-    back, 4/10    Subclinical hypothyroidism 6/13/2016       Patient Active Problem List    Diagnosis Date Noted    Osteopenia of multiple sites 11/26/2024    Other fatigue 05/31/2023    Congestion of nasal sinus 03/07/2023    Nocturia 12/13/2022    Muscle strain of left upper arm 12/13/2022    Gastroesophageal reflux disease without esophagitis 03/04/2022    Arthritis, hip 07/09/2021    Stearns's neuroma of right foot 05/23/2019    Drug-induced osteoporosis 12/20/2016    Impaired fasting blood sugar 10/21/2016    Dyslipidemia 10/21/2016    Subclinical hypothyroidism 06/13/2016    Compression fracture of lumbar spine, non-traumatic, with routine healing, subsequent encounter 06/13/2016    Pathological fracture due to osteoporosis 05/25/2016    MG (myasthenia gravis) ([facility]) 03/10/2016    Chronic back pain 03/10/2016       Allergies:Cillins [penicillins] and Merrem [meropenem]    Current Outpatient Medications   Medication Sig Dispense Refill    diclofenac sodium (VOLTAREN) 1 % Gel Apply 1 g topically 3 times a day as needed (pain). 350 g 6    ezetimibe (ZETIA) 10 MG Tab Take 1 Tablet by mouth every day. 90 Tablet 2    predniSONE (DELTASONE) 1 MG Tab Take 1 mg by mouth in the morning every Mon, Wed, Fri, and Sun. 1 mg tablet every Thurs, Fri, Sat  Indications: Myasthenia Gravis      azaTHIOprine (IMURAN) 50 MG Tab Take 100 mg by mouth 2 times a day.      famotidine (PEPCID) 20 MG Tab Take 20 mg by mouth 1 time a day as needed.      pyridostigmine (MESTINON) 60 MG Tab Take  60 mg by mouth 3 times a day. Indications: Myasthenia Gravis      B Complex Vitamins (B COMPLEX 1 PO) Take 1 Tab by mouth every day.      Cholecalciferol (VITAMIN D) 2000 UNITS Cap Take 1 Cap by mouth every day.      CALCIUM PO Take 1 Tab by mouth every day.      Flaxseed, Linseed, (FLAXSEED OIL PO) Take 1 Tab by mouth every day. (Patient not taking: Reported on 11/26/2024)       No current facility-administered medications for this visit.       Social History     Tobacco Use    Smoking status: Never    Smokeless tobacco: Never   Vaping Use    Vaping status: Never Used   Substance Use Topics    Alcohol use: No     Alcohol/week: 0.0 oz    Drug use: No       Family History   Problem Relation Age of Onset    Heart Attack Mother     Heart Attack Father          Review of Systems:   Review of Systems   Constitutional:  Positive for malaise/fatigue. Negative for chills, fever and weight loss.   HENT:  Negative for congestion and sore throat.    Eyes:  Positive for double vision. Negative for blurred vision, photophobia and pain.   Respiratory:  Negative for cough and shortness of breath.    Cardiovascular:  Negative for chest pain, palpitations and leg swelling.   Gastrointestinal:  Negative for abdominal pain, nausea and vomiting.   Genitourinary:  Negative for dysuria, frequency and urgency.   Musculoskeletal:  Positive for back pain and myalgias.   Neurological:  Negative for dizziness, weakness and headaches.   Psychiatric/Behavioral:  Negative for depression. The patient is not nervous/anxious.        Exam:  /64 (BP Location: Left arm, Patient Position: Sitting, BP Cuff Size: Small adult)   Pulse 61   Temp 35.9 °C (96.6 °F) (Temporal)   Wt 63.8 kg (140 lb 9.6 oz)   SpO2 94%  Body mass index is 24.13 kg/m².  Physical Exam  Constitutional:       General: He is not in acute distress.     Appearance: He is normal weight. He is not toxic-appearing.   HENT:      Head: Normocephalic and atraumatic.      Right Ear:  Tympanic membrane, ear canal and external ear normal.      Left Ear: Tympanic membrane, ear canal and external ear normal.      Mouth/Throat:      Mouth: Mucous membranes are moist.      Pharynx: Oropharynx is clear.   Cardiovascular:      Rate and Rhythm: Normal rate and regular rhythm.      Pulses: Normal pulses.   Pulmonary:      Effort: Pulmonary effort is normal.   Abdominal:      General: There is no distension.      Palpations: Abdomen is soft.      Tenderness: There is no abdominal tenderness.   Musculoskeletal:      Cervical back: Neck supple.      Right lower leg: No edema.      Left lower leg: No edema.      Comments: Muscle strength 5/5 upper and lower extremity   Skin:     General: Skin is warm and dry.   Neurological:      General: No focal deficit present.      Mental Status: He is alert.         Assessment/Plan:     Chronic bilateral low back pain without sciatica  Compression fracture of lumbar spine, non-traumatic, with routine healing, subsequent encounter  S/p kyphoplasty  Symptoms are stable, has occasional flares.  He did in PT with improvement in lower back pain and hip weakness in the past.  He is requesting a referral to see PT again,  referral placed for [facility]  Continue diclofenac gel as needed for pain  - diclofenac sodium (VOLTAREN) 1 % Gel; Apply 1 g topically 3 times a day as needed (pain).  Dispense: 350 g; Refill: 6  - Referral to Physical Therapy    MG (myasthenia gravis) (H[facility])  Other fatigue  He remains on Azathioprine, Pyridostigmine and Prednisone. Followed by Dr. Obando  He continues to have double vision, suspect ocular MG. He was referred to Neuro-Opthalmo, contact information for Dr. Siddiqui office provided to patient.  Fatigue likely related to MG, patient pending evaluation for SHANNA (has appt scheduled for next year).  TSH free T4, free T3 are within normal limits.  However, anti-TPO and antithyroglobulin were elevated on recent labs which ay lead to development of  hypothyroidism.  Continue to monitor  Continue current medications and routine follow-up with neurology      Dyslipidemia  Stable, patient continues to do well on Zetia.  Total cholesterol 166, LDL 96  Calcium CT in 2018 score 1.1.  Continue Zetia, may consider CT calcium score at next visit  - Referral to Physical Therapy    Osteopenia of right forearm  Has history of drug induced osteoporosis.  Completed 5 years of bisphosphonate therapy.    Recent DEXA scan with osteopenia of right forearm, normal bone density of the right femur  Continue vitamin D3 and calcium supplement  Encourage weightbearing exercises and resistance training.    Hashimoto's thyroiditis  Labs with elevated anti-TPO and antithyroglobulin  TSH, free T4, free T3 within normal limit  Continue to monitor for development of hypothyroidism    All imaging results and lab results and consult notes are reviewed at this visit.  Followup: Return in about 6 months (around 5/26/2025).

## 2025-01-02 ENCOUNTER — APPOINTMENT (OUTPATIENT)
Dept: URBAN - METROPOLITAN AREA CLINIC 15 | Facility: CLINIC | Age: 65
Setting detail: DERMATOLOGY
End: 2025-01-02

## 2025-01-02 DIAGNOSIS — B07.8 OTHER VIRAL WARTS: ICD-10-CM

## 2025-01-02 DIAGNOSIS — D22 MELANOCYTIC NEVI: ICD-10-CM

## 2025-01-02 DIAGNOSIS — L81.4 OTHER MELANIN HYPERPIGMENTATION: ICD-10-CM

## 2025-01-02 DIAGNOSIS — L82.1 OTHER SEBORRHEIC KERATOSIS: ICD-10-CM

## 2025-01-02 DIAGNOSIS — D18.0 HEMANGIOMA: ICD-10-CM

## 2025-01-02 DIAGNOSIS — Z71.89 OTHER SPECIFIED COUNSELING: ICD-10-CM

## 2025-01-02 DIAGNOSIS — L84 CORNS AND CALLOSITIES: ICD-10-CM

## 2025-01-02 PROBLEM — D22.62 MELANOCYTIC NEVI OF LEFT UPPER LIMB, INCLUDING SHOULDER: Status: ACTIVE | Noted: 2025-01-02

## 2025-01-02 PROBLEM — D22.61 MELANOCYTIC NEVI OF RIGHT UPPER LIMB, INCLUDING SHOULDER: Status: ACTIVE | Noted: 2025-01-02

## 2025-01-02 PROBLEM — D22.72 MELANOCYTIC NEVI OF LEFT LOWER LIMB, INCLUDING HIP: Status: ACTIVE | Noted: 2025-01-02

## 2025-01-02 PROBLEM — D18.01 HEMANGIOMA OF SKIN AND SUBCUTANEOUS TISSUE: Status: ACTIVE | Noted: 2025-01-02

## 2025-01-02 PROBLEM — D22.71 MELANOCYTIC NEVI OF RIGHT LOWER LIMB, INCLUDING HIP: Status: ACTIVE | Noted: 2025-01-02

## 2025-01-02 PROBLEM — D22.5 MELANOCYTIC NEVI OF TRUNK: Status: ACTIVE | Noted: 2025-01-02

## 2025-01-02 PROCEDURE — 17110 DESTRUCTION B9 LES UP TO 14: CPT

## 2025-01-02 PROCEDURE — ? COUNSELING

## 2025-01-02 PROCEDURE — ? LIQUID NITROGEN

## 2025-01-02 PROCEDURE — ? ADDITIONAL NOTES

## 2025-01-02 PROCEDURE — 99203 OFFICE O/P NEW LOW 30 MIN: CPT | Mod: 25

## 2025-01-02 ASSESSMENT — LOCATION DETAILED DESCRIPTION DERM
LOCATION DETAILED: RIGHT DISTAL RADIAL THUMB
LOCATION DETAILED: LEFT LATERAL EYEBROW
LOCATION DETAILED: RIGHT VENTRAL PROXIMAL FOREARM
LOCATION DETAILED: LEFT CENTRAL FRONTAL SCALP
LOCATION DETAILED: LEFT INFERIOR TEMPLE
LOCATION DETAILED: RIGHT DISTAL POSTERIOR UPPER ARM
LOCATION DETAILED: RIGHT PROXIMAL DORSAL FOREARM
LOCATION DETAILED: RIGHT MEDIAL INFERIOR CHEST
LOCATION DETAILED: LEFT MEDIAL PLANTAR MIDFOOT
LOCATION DETAILED: LEFT VENTRAL DISTAL FOREARM
LOCATION DETAILED: RIGHT LATERAL SUPERIOR CHEST
LOCATION DETAILED: RIGHT MEDIAL UPPER BACK
LOCATION DETAILED: RIGHT ANTERIOR DISTAL THIGH
LOCATION DETAILED: RIGHT POSTERIOR SHOULDER
LOCATION DETAILED: LEFT VENTRAL PROXIMAL FOREARM
LOCATION DETAILED: LEFT PROXIMAL DORSAL FOREARM
LOCATION DETAILED: LEFT LATERAL ABDOMEN
LOCATION DETAILED: LEFT SUPERIOR MEDIAL MIDBACK
LOCATION DETAILED: LEFT LATERAL SUPERIOR CHEST
LOCATION DETAILED: LEFT PROXIMAL CALF
LOCATION DETAILED: LEFT SUPERIOR LATERAL UPPER BACK
LOCATION DETAILED: LEFT MEDIAL SUPERIOR CHEST
LOCATION DETAILED: RIGHT RADIAL PALM
LOCATION DETAILED: RIGHT VENTRAL DISTAL FOREARM
LOCATION DETAILED: RIGHT SUPERIOR LATERAL MIDBACK
LOCATION DETAILED: LEFT INFERIOR CENTRAL MALAR CHEEK
LOCATION DETAILED: RIGHT PROXIMAL CALF
LOCATION DETAILED: LEFT ANTERIOR DISTAL THIGH
LOCATION DETAILED: LEFT PROXIMAL POSTERIOR UPPER ARM
LOCATION DETAILED: LEFT DISTAL POSTERIOR THIGH
LOCATION DETAILED: LEFT POSTERIOR SHOULDER
LOCATION DETAILED: RIGHT MEDIAL FRONTAL SCALP
LOCATION DETAILED: RIGHT DISTAL POSTERIOR THIGH

## 2025-01-02 ASSESSMENT — LOCATION SIMPLE DESCRIPTION DERM
LOCATION SIMPLE: LEFT THIGH
LOCATION SIMPLE: CHEST
LOCATION SIMPLE: LEFT FOREARM
LOCATION SIMPLE: RIGHT SHOULDER
LOCATION SIMPLE: LEFT UPPER BACK
LOCATION SIMPLE: LEFT POSTERIOR THIGH
LOCATION SIMPLE: LEFT TEMPLE
LOCATION SIMPLE: LEFT UPPER ARM
LOCATION SIMPLE: LEFT EYEBROW
LOCATION SIMPLE: RIGHT HAND
LOCATION SIMPLE: LEFT SHOULDER
LOCATION SIMPLE: LEFT SCALP
LOCATION SIMPLE: LEFT LOWER BACK
LOCATION SIMPLE: RIGHT THIGH
LOCATION SIMPLE: ABDOMEN
LOCATION SIMPLE: LEFT CALF
LOCATION SIMPLE: LEFT CHEEK
LOCATION SIMPLE: RIGHT LOWER BACK
LOCATION SIMPLE: RIGHT UPPER ARM
LOCATION SIMPLE: LEFT PLANTAR SURFACE
LOCATION SIMPLE: RIGHT SCALP
LOCATION SIMPLE: RIGHT THUMB
LOCATION SIMPLE: RIGHT POSTERIOR THIGH
LOCATION SIMPLE: RIGHT FOREARM
LOCATION SIMPLE: RIGHT CALF
LOCATION SIMPLE: RIGHT UPPER BACK

## 2025-01-02 ASSESSMENT — LOCATION ZONE DERM
LOCATION ZONE: LEG
LOCATION ZONE: FACE
LOCATION ZONE: ARM
LOCATION ZONE: HAND
LOCATION ZONE: TRUNK
LOCATION ZONE: FEET
LOCATION ZONE: SCALP
LOCATION ZONE: FINGER

## 2025-01-02 NOTE — PROCEDURE: ADDITIONAL NOTES
Render Risk Assessment In Note?: no
Detail Level: Zone
Additional Notes: Recommended Dr. Jenkins’s corn remover.

## 2025-01-02 NOTE — PROCEDURE: LIQUID NITROGEN
Show Aperture Variable?: Yes
Include Z78.9 (Other Specified Conditions Influencing Health Status) As An Associated Diagnosis?: No
Detail Level: Detailed
Post-Care Instructions: I reviewed with the patient in detail post-care instructions. Patient is to wear sunprotection, and avoid picking at any of the treated lesions. Pt may apply Vaseline to crusted or scabbing areas.
Consent: The patient's consent was obtained including but not limited to risks of crusting, scabbing, blistering, scarring, darker or lighter pigmentary change, recurrence, incomplete removal and infection.
Medical Necessity Information: It is in your best interest to select a reason for this procedure from the list below. All of these items fulfill various CMS LCD requirements except the new and changing color options.
Medical Necessity Clause: This procedure was medically necessary because the lesions that were treated were:
Spray Paint Text: The liquid nitrogen was applied to the skin utilizing a spray paint frosting technique.
Duration Of Freeze Thaw-Cycle (Seconds): 0
Medical Necessity Clause: This procedure was medically necessary because the lesions that were treated were:  If lesion does not resolve, bx is needed.

## 2025-03-04 ENCOUNTER — OFFICE VISIT (OUTPATIENT)
Dept: SLEEP MEDICINE | Facility: MEDICAL CENTER | Age: 65
End: 2025-03-04
Attending: INTERNAL MEDICINE
Payer: MEDICARE

## 2025-03-04 VITALS
HEART RATE: 66 BPM | DIASTOLIC BLOOD PRESSURE: 84 MMHG | BODY MASS INDEX: 23.9 KG/M2 | SYSTOLIC BLOOD PRESSURE: 132 MMHG | HEIGHT: 64 IN | OXYGEN SATURATION: 97 % | RESPIRATION RATE: 16 BRPM | WEIGHT: 140 LBS

## 2025-03-04 DIAGNOSIS — G70.00 MYASTHENIA GRAVIS (HCC): ICD-10-CM

## 2025-03-04 DIAGNOSIS — G47.30 SLEEP DISORDER BREATHING: Primary | ICD-10-CM

## 2025-03-04 DIAGNOSIS — Z91.89 AT RISK FOR SLEEP APNEA: ICD-10-CM

## 2025-03-04 DIAGNOSIS — R53.83 FATIGUE, UNSPECIFIED TYPE: ICD-10-CM

## 2025-03-04 PROCEDURE — 99213 OFFICE O/P EST LOW 20 MIN: CPT | Performed by: INTERNAL MEDICINE

## 2025-03-04 PROCEDURE — 99204 OFFICE O/P NEW MOD 45 MIN: CPT | Performed by: STUDENT IN AN ORGANIZED HEALTH CARE EDUCATION/TRAINING PROGRAM

## 2025-03-04 ASSESSMENT — PATIENT HEALTH QUESTIONNAIRE - PHQ9: CLINICAL INTERPRETATION OF PHQ2 SCORE: 0

## 2025-03-04 ASSESSMENT — FIBROSIS 4 INDEX: FIB4 SCORE: 1.93

## 2025-03-04 NOTE — PROGRESS NOTES
[facility] Consult Note     Date: 3/4/2025 / Time: 7:54 AM      Thank you for requesting a sleep medicine consultation on Wang Ivy at the sleep center. Presents today with the chief complaints of snoring, daytime fatigue. He is referred by Cristiana Calloway D.O.  [address] [place] [address] for evaluation and treatment of sleep disorder breathing .     HISTORY OF PRESENT ILLNESS:     Wang Ivy is a 65 y.o. male with myasthenia gravis, GERD, dyslipidemia, fatigue and snoring.  Presents to Sleep Clinic for evaluation of sleep.     He reports having good home sleep study completed approximately 2 years ago.  States the study that time indicated potential sleep apnea that was mild or borderline.  He was not placed on therapy following that study.    He does follow with a neurologist Dr. Obando in [facility] regarding his myasthenia gravis.    He states during the day he can have low energy at times.  He has had some trouble with memory in the past.  He does occasionally of night sweats.  He has been told that he snores and he does grind his teeth.  Denies any purposeful movements and sleep.    As per supplemental questionnaire to be scanned or imported into chart:    [place] Sleepiness Score: 7    Sleep Schedule  Bedtime: Weekday & Weekend 10pm   Wake time: Weekday 5am Weekend  same   Sleep-onset latency: 30 min to 1 hr with TV on   Awakenings from sleep: 0-1  Difficulty falling back asleep: No  Bedroom partner: No  Naps: No     DAYTIME SYMPTOMS:   Excessive daytime sleepiness: No   Daytime fatigue: Yes  Difficulty concentrating: No  Memory problems: Yes  Irritability:No   Work/school performance issues: No   Sleepiness with driving: No   Caffeine/stimulant use: Yes  Alcohol use:No     SLEEP RELATED SYMPTOMS  Snoring: Yes per others   Witnessed apnea or gasping/choking: No   Dry mouth or mouth breathing: No   Night Sweating: Yes  Teeth grinding/biting: Yes  Morning headaches: No  "  Refreshed Upon Awakening: Yes     SLEEP RELATED BEHAVIORS:  Parasomnias (walking, talking, eating, violence): No   Leg kicking: No   Restless legs - \"urge to move\": maybe   Nightmares: No  Recurrent: No   Dream enactment: No      NARCOLEPSY:  Cataplexy: No   Sleep paralysis: No   Sleep attacks: No   Hypnagogic/hypnopompic hallucinations: No     MEDICAL HISTORY  Past Medical History:   Diagnosis Date    Adrenal insufficiency ([facility]) 06/13/2016    Cataract     r, early stage    Compression fracture of cervical spine ([facility]) 06/13/2016    Flu     03-    Myasthenia gravis ([facility]) 01/01/2013    Pain 03/29/2016    back, 4/10    Snoring     Subclinical hypothyroidism 06/13/2016        SURGICAL HISTORY  Past Surgical History:   Procedure Laterality Date    KYPHOPLASTY N/A 4/4/2016    Procedure: KYPHOPLASTY L2,3,4;  Surgeon: Manan Larsen M.D.;  Location: SURGERY [facility];  Service:     CYST EXCISION Left     ganglion        FAMILY HISTORY  Family History   Problem Relation Age of Onset    Heart Attack Mother     Heart Attack Father        SOCIAL HISTORY  Social History     Socioeconomic History    Marital status: Single   Tobacco Use    Smoking status: Never    Smokeless tobacco: Never   Vaping Use    Vaping status: Never Used   Substance and Sexual Activity    Alcohol use: No     Alcohol/week: 0.0 oz    Drug use: No    Sexual activity: Never     Social Drivers of Health     Financial Resource Strain: Low Risk  (8/15/2024)    Overall Financial Resource Strain (CARDIA)     Difficulty of Paying Living Expenses: Not hard at all   Food Insecurity: No Food Insecurity (8/15/2024)    Hunger Vital Sign     Worried About Running Out of Food in the Last Year: Never true     Ran Out of Food in the Last Year: Never true   Transportation Needs: No Transportation Needs (8/15/2024)    PRAPARE - Transportation     Lack of Transportation (Medical): No     Lack of Transportation (Non-Medical): No   Physical Activity: Sufficiently " Active (8/15/2024)    Exercise Vital Sign     Days of Exercise per Week: 7 days     Minutes of Exercise per Session: 30 min   Stress: No Stress Concern Present (8/15/2024)    [employer] [place] [employer] - Occupational Stress Questionnaire     Feeling of Stress : Only a little   Social Connections: Unknown (8/15/2024)    Social Connection and Isolation Panel [NHANES]     Frequency of Communication with Friends and Family: Three times a week     Frequency of Social Gatherings with Friends and Family: Three times a week     Marital Status: Never    Housing Stability: Low Risk  (8/15/2024)    Housing Stability Vital Sign     Unable to Pay for Housing in the Last Year: No     Number of Times Moved in the Last Year: 1     Homeless in the Last Year: No        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 350 g 6    ezetimibe (ZETIA) 10 MG Tab Take 1 Tablet by mouth every day. 90 Tablet 2    predniSONE (DELTASONE) 1 MG Tab Take 1 mg by mouth in the morning every Mon, Wed, Fri, and Sun. 1 mg tablet every Thurs, Fri, Sat  Indications: Myasthenia Gravis      azaTHIOprine (IMURAN) 50 MG Tab Take 100 mg by mouth 2 times a day.      famotidine (PEPCID) 20 MG Tab Take 20 mg by mouth 1 time a day as needed.      pyridostigmine (MESTINON) 60 MG Tab Take 60 mg by mouth 3 times a day. Indications: Myasthenia Gravis      B Complex Vitamins (B COMPLEX 1 PO) Take 1 Tab by mouth every day.      Cholecalciferol (VITAMIN D) 2000 UNITS Cap Take 1 Cap by mouth every day.      CALCIUM PO Take 1 Tab by mouth every day.       No current facility-administered medications for this visit.       REVIEW OF SYSTEMS  Constitutional: Denies fevers, Denies weight changes  Ears/Nose/Throat/Mouth: Denies nasal congestion or sore throat   Cardiovascular:  "Denies chest pain  Respiratory: Denies shortness of breath, Denies cough  Gastrointestinal/Hepatic: Denies nausea, vomiting  Sleep: see HPI    Physical Examination:  Vitals/ General Appearance:   Weight/BMI: Body mass index is 24.03 kg/m².  /84 (BP Location: Left arm, Patient Position: Sitting, BP Cuff Size: Adult)   Pulse 66   Resp 16   Ht 1.626 m (5' 4\")   Wt 63.5 kg (140 lb)   SpO2 97%   Vitals:    03/04/25 0753   BP: 132/84   BP Location: Left arm   Patient Position: Sitting   BP Cuff Size: Adult   Pulse: 66   Resp: 16   SpO2: 97%   Weight: 63.5 kg (140 lb)   Height: 1.626 m (5' 4\")       Pt. is alert and oriented to time, place and person. Cooperative and in no apparent distress.     Constitutional: Alert, no distress, well-groomed.  Skin: No rashes in visible areas.  Eye: Round. Conjunctiva clear, lids normal. No icterus.   ENT EXAM  Nasal alae/valves collapsible: No   Nasal septum deviation: No   Nasal turbinate hypertrophy: No   Hard palate narrow: No   Hard palate high: No   Soft palate/uvula (Mallampati score): 4  Tongue Scalloping: No   Retrognathia: No   Micrognathia: No   Cardiovascular:no murmus/gallops/rubs, normal S1 and S2 heart sounds, regular rate and rhythm  Pulmonary:Clear to auscultation, No wheezes, No crackles.  Neurologic:Awake, alert and oriented x 3, Normal age appropriate gait, No involuntary motions.  Extremities: No clubbing, cyanosis, or edema       ASSESSMENT AND PLAN   Wang Ivy is a 65 y.o. male with myasthenia gravis, GERD, dyslipidemia, fatigue and snoring.  Presents to Sleep Clinic for evaluation of sleep.     Wang Ivy  has symptoms of Obstructive Sleep Apnea (SHANNA). Wang Ivy has symptoms of snoring, night sweats, daytime fatigue.    Pt has risk factors for SHANNA include age and crowded oropharynx.     The pathophysiology of SHANNA and the increased risk of cardiovascular morbidity from untreated SHANNA is discussed in detail with the patient. He "  also has myasthenia gravis, GERD, which can be worsened by SHANNA.      We have discussed diagnostic options including in-laboratory, attended polysomnography and home sleep testing. We have also discussed treatment options including airway pressurization, reconstructive otolaryngologic surgery, dental appliances and weight management.     Subsequently,treatment options will be discussed based on the diagnostic study. Meanwhile, He is urged to avoid supine sleep, weight gain and alcoholic beverages since all of these can worsen SHANNA. He is cautioned against drowsy driving. If He feels sleepy while driving, advised must pull over for a break/nap, rather than persist on the road, in the interest of Pt's own safety and that of others on the road.    Plan  -  He  will be scheduled for an overnight PSG to assess sleep related breathing disorder.  - Follow up 1-2 weeks after sleep study to discuss results and treatment options moving forward   -Advised to reach out via MyChart or by phone with any questions or concerns.     Please note portions of this record was created using voice recognition software. I have made every reasonable attempt to correct obvious errors, but I expect that there are errors of grammar and possibly content I did not discover before finalizing the note.

## 2025-03-24 DIAGNOSIS — E78.5 DYSLIPIDEMIA: ICD-10-CM

## 2025-03-24 NOTE — TELEPHONE ENCOUNTER
Received request via: Pharmacy    Was the patient seen in the last year in this department? Yes    Does the patient have an active prescription (recently filled or refills available) for medication(s) requested? No    Pharmacy Name: [facility] [address] [place], [place] - [address]     Does the patient have assisted Plus and need 100-day supply? (This applies to ALL medications) Yes, quantity updated to 100 days

## 2025-03-25 RX ORDER — EZETIMIBE 10 MG/1
10 TABLET ORAL DAILY
Qty: 90 TABLET | Refills: 0 | Status: SHIPPED | OUTPATIENT
Start: 2025-03-25

## 2025-04-17 ENCOUNTER — APPOINTMENT (OUTPATIENT)
Dept: SLEEP MEDICINE | Facility: MEDICAL CENTER | Age: 65
End: 2025-04-17
Attending: STUDENT IN AN ORGANIZED HEALTH CARE EDUCATION/TRAINING PROGRAM
Payer: MEDICARE

## 2025-04-17 DIAGNOSIS — Z91.89 AT RISK FOR SLEEP APNEA: ICD-10-CM

## 2025-04-17 DIAGNOSIS — G70.00 MYASTHENIA GRAVIS (HCC): ICD-10-CM

## 2025-04-17 DIAGNOSIS — R53.83 FATIGUE, UNSPECIFIED TYPE: ICD-10-CM

## 2025-04-17 DIAGNOSIS — G47.30 SLEEP DISORDER BREATHING: ICD-10-CM

## 2025-04-17 PROCEDURE — 95810 POLYSOM 6/> YRS 4/> PARAM: CPT | Performed by: STUDENT IN AN ORGANIZED HEALTH CARE EDUCATION/TRAINING PROGRAM

## 2025-04-18 NOTE — PROCEDURES
Patient: ESTRELLA NGUYỄN  ID: [id number] Date: 4/17/2025   MONTAGE: Standard  STUDY TYPE: Diagnostic  RECORDING TECHNIQUE:   After the scalp was prepared, gold plated electrodes were applied to the scalp according to the International 10-20 System. EEG (electroencephalogram) was continuously monitored from the O1-M2, O2-M1, C3-M2, C4-M1, F3-M2, and F4-M1. EOGs (electrooculograms) were monitored by electrodes placed at the left and right outer canthi. Chin EMG (electromyogram) was monitored by electrodes placed on the mentalis and sub-mentalis muscles. Nasal and oral airflow were monitored using a triple port thermocouple as well as oronasal pressure transducer. Respiratory effort was measured by inductive plethysmography technology employing abdominal and thoracic belts. Blood oxygen saturation and pulse were monitored by pulse oximetry. Heart rhythm was monitored by surface electrocardiogram. Leg EMG was studied using surface electrodes placed on left and right anterior tibialis. A microphone was used to monitor tracheal sounds and snoring. Body position was monitored and documented by technician observation.   SCORING CRITERIA:   A modification of the AASM manual for scoring of sleep and associated events was used. Obstructive apneas were scored by cessation of airflow for at least 10 seconds with continuing respiratory effort. Central apneas were scored by cessation of airflow for at least 10 seconds with no respiratory effort. Hypopneas were scored by a 30% or more reduction in airflow for at least 10 seconds accompanied by arterial oxygen desaturation of 3% or an arousal. For CMS (Medicare) patients, per AASM rule 1B, hypopneas are scored by 30% with mild reduction in airflow for at least 10 seconds accompanied by arterial saturation decreased at 4%.    Study start time was 08:41:53 PM. Diagnostic recording time was 8h 21.0m with a total sleep time of 5h 54.0m resulting in a sleep efficiency of 70.66%%. Sleep latency  from the start of the study was 104 minutes and the latency from sleep to REM was 76 minutes. In total,57 arousals were scored for an arousal index of 9.7.  Respiratory:  There were a total of 1 apneas consisting of 0 obstructive apneas, 0 mixed apneas, and 1 central apneas. A total of 12 hypopneas were scored. The apnea index was 0.17 per hour and the hypopnea index was 2.03 per hour resulting in an overall 4% AHI of 2.20. AHI during REM was 3.4 and AHI while supine was 2.84.  Oximetry:  There was a mean oxygen saturation of 94.0%. The minimum oxygen saturation in NREM was 90.0 % and in REM was 88.0%. The patient spent 0.1 minutes of TST with SaO2 <88%.  Cardiac:  The highest heart rate seen while awake was 88 BPM while the highest heart rate during sleep was 87 BPM with an average sleeping heart rate of 53 BPM.  Limb Movements:  There were a total of 7 PLMs during sleep which resulted in a PLMS index of 1.2. Of these, 1 were associated with arousals which resulted in a PLMS arousal index of 0.2.    Impression:  1.  Does not meet criteria for obstructive sleep apnea with an overall 4% AHI of 2.2 events an hour  2.  No significant nocturnal hypoxia seen with time at or below 80% saturation of 0.1 minutes  3.  Supine REM sleep was seen during study  4.  Slightly decreased sleep efficiency at 71%    Recommendations:  At this time does not meet criteria for obstructive sleep apnea with an overall AHI of 2.2 events an hour (less than 5 is considered normal).  Supine REM sleep was seen during study.  No intervention indicated at this time.  Clinical correlation is required. In general patients with sleep apnea are advised to avoid alcohol, sedatives and not to operate a motor vehicle while drowsy.  Untreated sleep apnea increases the risk for cardiovascular and neurovascular disease.

## 2025-04-29 ENCOUNTER — OFFICE VISIT (OUTPATIENT)
Dept: SLEEP MEDICINE | Facility: MEDICAL CENTER | Age: 65
End: 2025-04-29
Attending: STUDENT IN AN ORGANIZED HEALTH CARE EDUCATION/TRAINING PROGRAM
Payer: MEDICARE

## 2025-04-29 VITALS
OXYGEN SATURATION: 95 % | WEIGHT: 140 LBS | DIASTOLIC BLOOD PRESSURE: 80 MMHG | BODY MASS INDEX: 23.9 KG/M2 | HEIGHT: 64 IN | SYSTOLIC BLOOD PRESSURE: 112 MMHG | HEART RATE: 85 BPM | RESPIRATION RATE: 16 BRPM

## 2025-04-29 DIAGNOSIS — R06.83 SNORING: ICD-10-CM

## 2025-04-29 DIAGNOSIS — R53.83 FATIGUE, UNSPECIFIED TYPE: ICD-10-CM

## 2025-04-29 PROCEDURE — 99214 OFFICE O/P EST MOD 30 MIN: CPT | Performed by: STUDENT IN AN ORGANIZED HEALTH CARE EDUCATION/TRAINING PROGRAM

## 2025-04-29 ASSESSMENT — FIBROSIS 4 INDEX: FIB4 SCORE: 1.93

## 2025-04-29 NOTE — PROGRESS NOTES
[facility] Follow-up Visit    CC: Follow-up to discuss sleep study results      HPI:  Wang Ivy is a 65 y.o.male  with GERD, myasthenia gravis, dyslipidemia, snoring, and fatigue.  Presents today to discuss sleep study results.    He reports the night of the sleep study was on the best night sleep but he was able to sleep.  He did not review the study results prior to today's visit.    At last visit it was discussed that he does snore in his sleep.  He does have low energy throughout the day.  He does find his sleep restorative.      Sleep History  4/17/2025 PSG showed an overall 4% AHI of 2.2 events an hour.  Time at or below 80% saturation of 0.1 minutes.  Did not meet criteria for obstructive sleep apnea as an AHI less than 5 is normal.  Overall normal sleep architecture no physiologic disruptions to sleep detected    Patient Active Problem List    Diagnosis Date Noted    Hashimoto's thyroiditis 11/27/2024    Osteopenia of multiple sites 11/26/2024    Other fatigue 05/31/2023    Congestion of nasal sinus 03/07/2023    Nocturia 12/13/2022    Muscle strain of left upper arm 12/13/2022    Gastroesophageal reflux disease without esophagitis 03/04/2022    Arthritis, hip 07/09/2021    Stearns's neuroma of right foot 05/23/2019    Drug-induced osteoporosis 12/20/2016    Impaired fasting blood sugar 10/21/2016    Dyslipidemia 10/21/2016    Subclinical hypothyroidism 06/13/2016    Compression fracture of lumbar spine, non-traumatic, with routine healing, subsequent encounter 06/13/2016    Pathological fracture due to osteoporosis 05/25/2016    MG (myasthenia gravis) ([facility]) 03/10/2016    Chronic back pain 03/10/2016       Past Medical History:   Diagnosis Date    Adrenal insufficiency ([facility]) 06/13/2016    Cataract     r, early stage    Compression fracture of cervical spine ([facility]) 06/13/2016    Flu     03-    Myasthenia gravis ([facility]) 01/01/2013    Pain 03/29/2016    back, 4/10    Snoring      Subclinical hypothyroidism 06/13/2016        Past Surgical History:   Procedure Laterality Date    KYPHOPLASTY N/A 4/4/2016    Procedure: KYPHOPLASTY L2,3,4;  Surgeon: Manan Larsen M.D.;  Location: SURGERY [facility];  Service:     CYST EXCISION Left     ganglion       Family History   Problem Relation Age of Onset    Heart Attack Mother     Heart Attack Father        Social History     Socioeconomic History    Marital status: Single     Spouse name: Not on file    Number of children: Not on file    Years of education: Not on file    Highest education level: Not on file   Occupational History    Not on file   Tobacco Use    Smoking status: Never    Smokeless tobacco: Never   Vaping Use    Vaping status: Never Used   Substance and Sexual Activity    Alcohol use: No     Alcohol/week: 0.0 oz    Drug use: No    Sexual activity: Never   Other Topics Concern    Not on file   Social History Narrative    Not on file     Social Drivers of Health     Financial Resource Strain: Low Risk  (8/15/2024)    Overall Financial Resource Strain (CARDIA)     Difficulty of Paying Living Expenses: Not hard at all   Food Insecurity: No Food Insecurity (8/15/2024)    Hunger Vital Sign     Worried About Running Out of Food in the Last Year: Never true     Ran Out of Food in the Last Year: Never true   Transportation Needs: No Transportation Needs (8/15/2024)    PRAPARE - Transportation     Lack of Transportation (Medical): No     Lack of Transportation (Non-Medical): No   Physical Activity: Sufficiently Active (8/15/2024)    Exercise Vital Sign     Days of Exercise per Week: 7 days     Minutes of Exercise per Session: 30 min   Stress: No Stress Concern Present (8/15/2024)    [place] [employer] [place] [employer] - Occupational Stress Questionnaire     Feeling of Stress : Only a little   Social Connections: Unknown (8/15/2024)    Social Connection and Isolation Panel [NHANES]     Frequency of Communication with Friends and Family:  Three times a week     Frequency of Social Gatherings with Friends and Family: Three times a week     Attends Zoroastrianism Services: Not on file     Active Member of Clubs or Organizations: Not on file     Attends Club or Organization Meetings: Not on file     Marital Status: Never    Intimate Partner Violence: Not on file   Housing Stability: Low Risk  (8/15/2024)    Housing Stability Vital Sign     Unable to Pay for Housing in the Last Year: No     Number of Times Moved in the Last Year: 1     Homeless in the Last Year: No       Current Outpatient Medications   Medication Sig Dispense Refill    ezetimibe (ZETIA) 10 MG Tab Take 1 Tablet by mouth every day. 90 Tablet 0    diclofenac sodium (VOLTAREN) 1 % Gel Apply 1 g topically 3 times a day as needed (pain). 350 g 6    predniSONE (DELTASONE) 1 MG Tab Take 1 mg by mouth in the morning every Mon, Wed, Fri, and Sun. 1 mg tablet every Thurs, Fri, Sat  Indications: Myasthenia Gravis      azaTHIOprine (IMURAN) 50 MG Tab Take 100 mg by mouth 2 times a day.      famotidine (PEPCID) 20 MG Tab Take 20 mg by mouth 1 time a day as needed.      pyridostigmine (MESTINON) 60 MG Tab Take 60 mg by mouth 3 times a day. Indications: Myasthenia Gravis      B Complex Vitamins (B COMPLEX 1 PO) Take 1 Tab by mouth every day.      Cholecalciferol (VITAMIN D) 2000 UNITS Cap Take 1 Cap by mouth every day.      CALCIUM PO Take 1 Tab by mouth every day.       No current facility-administered medications for this visit.        ALLERGIES: Cillins [penicillins] and Merrem [meropenem]    ROS  Constitutional: Denies fevers, Denies weight changes  Ears/Nose/Throat/Mouth: Denies nasal congestion or sore throat   Cardiovascular: Denies chest pain  Respiratory: Denies shortness of breath, Denies cough  Gastrointestinal/Hepatic: Denies nausea, vomiting  Sleep: see HPI      PHYSICAL EXAM  /80 (BP Location: Left arm, Patient Position: Sitting, BP Cuff Size: Adult)   Pulse 85   Resp 16   Ht  "1.626 m (5' 4\")   Wt 63.5 kg (140 lb)   SpO2 95%   BMI 24.03 kg/m²   Appearance: Well-nourished, well-developed, no acute distress  Eyes:  No scleral icterus , EOMI  Musculoskeletal:  Grossly normal; gait and station normal; digits and nails normal  Skin:  No rashes, petechiae, cyanosis  Neurologic: without focal signs; oriented to person, time, place, and purpose; judgement intact      Medical Decision Making   Assessment and Plan  Wang Ivy is a 65 y.o.male  with GERD, myasthenia gravis, dyslipidemia, snoring, and fatigue.  Presents today to discuss sleep study results.    The medical record was reviewed.    Fatigue  Reviewed recent PSG showing he does not meet criteria for obstructive sleep apnea.  There were no physiologic disruptions to sleep.  Advised the fatigue can be a very broad diagnosis and at this point nothing from his sleep study appears to be contributing to his fatigue.  Would recommend continue to keep the same bedtime and wake time.  Advised as his myasthenia allows to get 20 to 30 minutes of exercise a day.  Encouraged to get outside and get sunlight exposure during the day.    Plan  - Follow-up as needed    Return if symptoms worsen or fail to improve.      Please note portions of this record was created using voice recognition software. I have made every reasonable attempt to correct obvious errors, but I expect that there are errors of grammar and possibly content I did not discover before finalizing the note.      "

## 2025-06-13 DIAGNOSIS — E78.5 DYSLIPIDEMIA: ICD-10-CM

## 2025-06-13 NOTE — TELEPHONE ENCOUNTER
Received request via: Patient    Was the patient seen in the last year in this department? Yes    Does the patient have an active prescription (recently filled or refills available) for medication(s) requested? No    Pharmacy Name: [facility]    Does the patient have [facility] and need 100-day supply? (This applies to ALL medications) Patient does not have SCP

## 2025-06-17 RX ORDER — EZETIMIBE 10 MG/1
10 TABLET ORAL DAILY
Qty: 90 TABLET | Refills: 3 | Status: SHIPPED | OUTPATIENT
Start: 2025-06-17

## 2025-06-18 ENCOUNTER — OFFICE VISIT (OUTPATIENT)
Dept: INTERNAL MEDICINE | Facility: OTHER | Age: 65
End: 2025-06-18
Payer: MEDICARE

## 2025-06-18 VITALS
SYSTOLIC BLOOD PRESSURE: 119 MMHG | BODY MASS INDEX: 23.9 KG/M2 | OXYGEN SATURATION: 96 % | HEART RATE: 61 BPM | TEMPERATURE: 97.6 F | DIASTOLIC BLOOD PRESSURE: 72 MMHG | HEIGHT: 64 IN | WEIGHT: 140 LBS

## 2025-06-18 DIAGNOSIS — M72.2 PLANTAR FASCIITIS, LEFT: ICD-10-CM

## 2025-06-18 DIAGNOSIS — E06.3 HASHIMOTO'S THYROIDITIS: ICD-10-CM

## 2025-06-18 DIAGNOSIS — M85.89 OSTEOPENIA OF MULTIPLE SITES: ICD-10-CM

## 2025-06-18 DIAGNOSIS — E78.5 DYSLIPIDEMIA: ICD-10-CM

## 2025-06-18 DIAGNOSIS — E55.9 VITAMIN D DEFICIENCY: ICD-10-CM

## 2025-06-18 DIAGNOSIS — R73.03 PREDIABETES: ICD-10-CM

## 2025-06-18 DIAGNOSIS — G70.00 MYASTHENIA GRAVIS (HCC): Primary | ICD-10-CM

## 2025-06-18 DIAGNOSIS — R53.83 OTHER FATIGUE: ICD-10-CM

## 2025-06-18 DIAGNOSIS — M54.50 CHRONIC BILATERAL LOW BACK PAIN WITHOUT SCIATICA: ICD-10-CM

## 2025-06-18 DIAGNOSIS — G89.29 CHRONIC BILATERAL LOW BACK PAIN WITHOUT SCIATICA: ICD-10-CM

## 2025-06-18 DIAGNOSIS — Z79.60 LONG-TERM USE OF IMMUNOSUPPRESSANT MEDICATION: ICD-10-CM

## 2025-06-18 PROCEDURE — 99214 OFFICE O/P EST MOD 30 MIN: CPT | Performed by: INTERNAL MEDICINE

## 2025-06-18 PROCEDURE — 3078F DIAST BP <80 MM HG: CPT | Performed by: INTERNAL MEDICINE

## 2025-06-18 PROCEDURE — 3074F SYST BP LT 130 MM HG: CPT | Performed by: INTERNAL MEDICINE

## 2025-06-18 ASSESSMENT — FIBROSIS 4 INDEX: FIB4 SCORE: 1.93

## 2025-06-18 NOTE — PROGRESS NOTES
6/18/2025  Chief Complaint   Patient presents with    Medication Refill     zetia    Other     Had sleep study done and is wondering why he's still tired        HISTORY OF PRESENT ILLNESS: Patient is a 65 y.o. male established patient who presents today for follow-up.    He reports that overall he is doing fine, but continues to endorse fatigue for the past 4-5 years, fatigue waxes and wanes, sometimes he will feel fine but  other times he feels low energy and low libido. He recently had a normal sleep study. Prior labs with low Vitamin D, Vitamin B12 and TSH wnl, however, patient with + TPO. He denies frequent headaches, daytime somnolence or mood irritability.  He continues to follow with Neurology for MG, will see Dr. Obando on Friday. He remains on Imuran, prednisone and pyridostigmine.  He endorse that his double vision has improved, only occurs when he is tried. He was referred to Neuro-ophthalmology but has been unable to schedule an appointment. He believes that prednisone 1mg MWF has helped.   He reports muscle cramping in his left foot/calf with prolong walking, but denies any weakness, trouble swallowing or shortness of breath. Reports symptoms have been present for years, has seen podiatry in the past and received a foot pad and steriod injection which helped.       Gastroesophageal reflux disease without esophagitis  Reports symptoms are controlled on Pepcid. Denies any abdominal pain, N/V, abnormal weight loss.      Dyslipidemia  He remain on Zetia, tolerating medication without side effects. He has never been on statin therapy due to concerns of worsening myalgias with MG. Prior CT calcium score of 1.1 (2018)  Denies any chest pain, SHOB/TAPIA, palpitations.      Drug-induced osteoporosis, now osteopenia  Secondary high dose steroid use. Had compression fracture of L2-L4. He was on bisphosphate therapy for about 5 years. Recent DEXA 11/2024 with osteopenia  He continues to take Vitamin D and calcium  "supplementation.       Past Medical History[1]    Patient Active Problem List    Diagnosis Date Noted    Hashimoto's thyroiditis 11/27/2024    Osteopenia of multiple sites 11/26/2024    Other fatigue 05/31/2023    Congestion of nasal sinus 03/07/2023    Nocturia 12/13/2022    Muscle strain of left upper arm 12/13/2022    Gastroesophageal reflux disease without esophagitis 03/04/2022    Arthritis, hip 07/09/2021    Stearns's neuroma of right foot 05/23/2019    Drug-induced osteoporosis 12/20/2016    Impaired fasting blood sugar 10/21/2016    Dyslipidemia 10/21/2016    Subclinical hypothyroidism 06/13/2016    Compression fracture of lumbar spine, non-traumatic, with routine healing, subsequent encounter 06/13/2016    Pathological fracture due to osteoporosis 05/25/2016    MG (myasthenia gravis) ([facility]) 03/10/2016    Chronic back pain 03/10/2016       Allergies:Cillins [penicillins] and Merrem [meropenem]    Current Medications[2]    Social History[3]    Family History   Problem Relation Age of Onset    Heart Attack Mother     Heart Attack Father          Review of Systems:   Review of Systems   Constitutional:  Positive for malaise/fatigue. Negative for chills, fever and weight loss.   HENT:  Negative for congestion and sore throat.    Eyes:  Negative for blurred vision, photophobia and pain.   Respiratory:  Negative for sputum production and wheezing.    Cardiovascular:  Negative for chest pain, palpitations and leg swelling.   Gastrointestinal:  Negative for abdominal pain, constipation, diarrhea, heartburn, nausea and vomiting.   Neurological:  Negative for dizziness, weakness and headaches.       Exam:  /72 (BP Location: Left arm, Patient Position: Sitting, BP Cuff Size: Adult)   Pulse 61   Temp 36.4 °C (97.6 °F) (Temporal)   Ht 1.626 m (5' 4\")   Wt 63.5 kg (140 lb)   SpO2 96%  Body mass index is 24.03 kg/m².  Physical Exam      Assessment/Plan:     MG (myasthenia gravis) ([facility])  Long-term use of " immunosuppressant medication  Remains on Azathioprine, Pyridostigmine and Prednisone.   Followed by Dr. Obando, Neurology  - Comp Metabolic Panel; Future  - CBC WITHOUT DIFFERENTIAL; Future    Hashimoto's thyroiditis  TSH, free T3/T4 wnl, anti-TPO and anti-thyroglobulin elevated, which can be seen with MG  Continue to monitor  We discussed ordering labs, however, patient would like to defer to Dr. Obando who is monitoring.    Osteopenia of multiple sites  Has h/o drug induced osteoporosis, previously on bisphosphate x 5 years.  Recent DEXA with osteopenia  Continue Vitamin D and calcium supplement    Other fatigue  Vitamin D deficiency  Suspect secondary to MG.  Recent sleep study normal  Prior labs with normal TSH, free T3/T4 and Vitamin B12  Low Vitamin  Continue Vitamin D3, rechecks  Will check TST levels to r/o hypogonadism  - TESTOSTERONE, FREE AND TOTAL; Future  - VITAMIN D,25 HYDROXY (DEFICIENCY); Future    Chronic bilateral low back pain without sciatica  Long-standing history, he has participated in PT in the past which did help with lower back pain and hip weakness.  Has had prior kyphoplasty.   - Referral to Physical Therapy    Plantar fasciitis, left  Has seen podiatry in the past, had steroid injection and foot pad which he is no longer wearing.  Foot cramping/pain with prolong may also be associated with MG  Recommend trial of sole inserts and daily foot exercise and stretching.  We discussed a referral to podiatry, patient deferred at this time. He is open PT  - Referral to Physical Therapy  - Comp Metabolic Panel; Future    Prediabetes  - HEMOGLOBIN A1C; Future    Dyslipidemia  - Lipid Profile; Future      All imaging results and lab results and consult notes are reviewed at this visit.  Followup: Return in about 3 months (around 9/29/2025).         [1]   Past Medical History:  Diagnosis Date    Adrenal insufficiency ([facility]) 06/13/2016    Cataract     r, early stage    Compression fracture of cervical  spine ([facility]) 06/13/2016    Flu     03-    Myasthenia gravis ([facility]) 01/01/2013    Pain 03/29/2016    back, 4/10    Snoring     Subclinical hypothyroidism 06/13/2016   [2]   Current Outpatient Medications   Medication Sig Dispense Refill    ezetimibe (ZETIA) 10 MG Tab Take 1 Tablet by mouth every day. 90 Tablet 3    diclofenac sodium (VOLTAREN) 1 % Gel Apply 1 g topically 3 times a day as needed (pain). 350 g 6    predniSONE (DELTASONE) 1 MG Tab Take 1 mg by mouth in the morning every Mon, Wed, Fri, and Sun. 1 mg tablet every Thurs, Fri, Sat  Indications: Myasthenia Gravis      azaTHIOprine (IMURAN) 50 MG Tab Take 100 mg by mouth 2 times a day.      famotidine (PEPCID) 20 MG Tab Take 20 mg by mouth 1 time a day as needed.      pyridostigmine (MESTINON) 60 MG Tab Take 60 mg by mouth 3 times a day. Indications: Myasthenia Gravis      B Complex Vitamins (B COMPLEX 1 PO) Take 1 Tab by mouth every day.      Cholecalciferol (VITAMIN D) 2000 UNITS Cap Take 1 Cap by mouth every day.      CALCIUM PO Take 1 Tab by mouth every day.       No current facility-administered medications for this visit.   [3]   Social History  Tobacco Use    Smoking status: Never    Smokeless tobacco: Never   Vaping Use    Vaping status: Never Used   Substance Use Topics    Alcohol use: No     Alcohol/week: 0.0 oz    Drug use: No

## 2025-06-20 ASSESSMENT — ENCOUNTER SYMPTOMS
ABDOMINAL PAIN: 0
WEIGHT LOSS: 0
DIZZINESS: 0
CHILLS: 0
EYE PAIN: 0
DIARRHEA: 0
SPUTUM PRODUCTION: 0
HEARTBURN: 0
WEAKNESS: 0
PALPITATIONS: 0
NAUSEA: 0
CONSTIPATION: 0
HEADACHES: 0
WHEEZING: 0
SORE THROAT: 0
FEVER: 0
BLURRED VISION: 0
PHOTOPHOBIA: 0
VOMITING: 0

## 2025-08-11 ENCOUNTER — PHYSICAL THERAPY (OUTPATIENT)
Dept: PHYSICAL THERAPY | Facility: REHABILITATION | Age: 65
End: 2025-08-11
Attending: INTERNAL MEDICINE
Payer: MEDICARE

## 2025-08-11 DIAGNOSIS — G89.29 CHRONIC BILATERAL LOW BACK PAIN WITH SCIATICA, SCIATICA LATERALITY UNSPECIFIED: Primary | ICD-10-CM

## 2025-08-11 DIAGNOSIS — M54.41 CHRONIC BILATERAL LOW BACK PAIN WITH SCIATICA, SCIATICA LATERALITY UNSPECIFIED: Primary | ICD-10-CM

## 2025-08-11 DIAGNOSIS — M54.42 CHRONIC BILATERAL LOW BACK PAIN WITH SCIATICA, SCIATICA LATERALITY UNSPECIFIED: Primary | ICD-10-CM

## 2025-08-11 PROCEDURE — 97110 THERAPEUTIC EXERCISES: CPT

## 2025-08-11 PROCEDURE — 97162 PT EVAL MOD COMPLEX 30 MIN: CPT

## 2025-08-11 ASSESSMENT — ENCOUNTER SYMPTOMS
PAIN SCALE AT HIGHEST: 6
PAIN SCALE: 1
PAIN LOCATION: LEFT SHOULDER
QUALITY: DULL ACHE
PAIN SCALE AT LOWEST: 1

## 2025-08-18 ENCOUNTER — PHYSICAL THERAPY (OUTPATIENT)
Dept: PHYSICAL THERAPY | Facility: REHABILITATION | Age: 65
End: 2025-08-18
Attending: INTERNAL MEDICINE
Payer: MEDICARE

## 2025-08-18 DIAGNOSIS — M54.42 CHRONIC BILATERAL LOW BACK PAIN WITH SCIATICA, SCIATICA LATERALITY UNSPECIFIED: Primary | ICD-10-CM

## 2025-08-18 DIAGNOSIS — M54.41 CHRONIC BILATERAL LOW BACK PAIN WITH SCIATICA, SCIATICA LATERALITY UNSPECIFIED: Primary | ICD-10-CM

## 2025-08-18 DIAGNOSIS — G89.29 CHRONIC BILATERAL LOW BACK PAIN WITH SCIATICA, SCIATICA LATERALITY UNSPECIFIED: Primary | ICD-10-CM

## 2025-08-18 PROCEDURE — 97110 THERAPEUTIC EXERCISES: CPT

## 2025-08-18 PROCEDURE — 97140 MANUAL THERAPY 1/> REGIONS: CPT

## 2025-08-25 ENCOUNTER — PHYSICAL THERAPY (OUTPATIENT)
Dept: PHYSICAL THERAPY | Facility: REHABILITATION | Age: 65
End: 2025-08-25
Attending: INTERNAL MEDICINE
Payer: MEDICARE

## 2025-08-25 DIAGNOSIS — G89.29 CHRONIC BILATERAL LOW BACK PAIN WITH SCIATICA, SCIATICA LATERALITY UNSPECIFIED: Primary | ICD-10-CM

## 2025-08-25 DIAGNOSIS — M54.41 CHRONIC BILATERAL LOW BACK PAIN WITH SCIATICA, SCIATICA LATERALITY UNSPECIFIED: Primary | ICD-10-CM

## 2025-08-25 DIAGNOSIS — M54.42 CHRONIC BILATERAL LOW BACK PAIN WITH SCIATICA, SCIATICA LATERALITY UNSPECIFIED: Primary | ICD-10-CM

## 2025-08-25 PROCEDURE — 97110 THERAPEUTIC EXERCISES: CPT

## 2025-08-25 PROCEDURE — 97140 MANUAL THERAPY 1/> REGIONS: CPT
